# Patient Record
Sex: MALE | Race: WHITE | NOT HISPANIC OR LATINO | Employment: OTHER | URBAN - METROPOLITAN AREA
[De-identification: names, ages, dates, MRNs, and addresses within clinical notes are randomized per-mention and may not be internally consistent; named-entity substitution may affect disease eponyms.]

---

## 2017-05-05 ENCOUNTER — COMMUNICATION - HEALTHEAST (OUTPATIENT)
Dept: FAMILY MEDICINE | Facility: CLINIC | Age: 62
End: 2017-05-05

## 2017-09-27 ENCOUNTER — COMMUNICATION - HEALTHEAST (OUTPATIENT)
Dept: FAMILY MEDICINE | Facility: CLINIC | Age: 62
End: 2017-09-27

## 2017-09-27 ENCOUNTER — AMBULATORY - HEALTHEAST (OUTPATIENT)
Dept: FAMILY MEDICINE | Facility: CLINIC | Age: 62
End: 2017-09-27

## 2017-09-27 DIAGNOSIS — K43.9 VENTRAL HERNIA: ICD-10-CM

## 2017-10-18 ENCOUNTER — OFFICE VISIT - HEALTHEAST (OUTPATIENT)
Dept: SURGERY | Facility: CLINIC | Age: 62
End: 2017-10-18

## 2017-10-18 DIAGNOSIS — K42.9 UMBILICAL HERNIA WITHOUT OBSTRUCTION AND WITHOUT GANGRENE: ICD-10-CM

## 2017-10-30 ENCOUNTER — COMMUNICATION - HEALTHEAST (OUTPATIENT)
Dept: FAMILY MEDICINE | Facility: CLINIC | Age: 62
End: 2017-10-30

## 2017-10-30 ENCOUNTER — OFFICE VISIT - HEALTHEAST (OUTPATIENT)
Dept: FAMILY MEDICINE | Facility: CLINIC | Age: 62
End: 2017-10-30

## 2017-10-30 DIAGNOSIS — Z01.818 PREOP EXAMINATION: ICD-10-CM

## 2017-10-30 DIAGNOSIS — K43.9 VENTRAL HERNIA: ICD-10-CM

## 2017-10-30 ASSESSMENT — MIFFLIN-ST. JEOR: SCORE: 1886.01

## 2017-10-31 ENCOUNTER — COMMUNICATION - HEALTHEAST (OUTPATIENT)
Dept: FAMILY MEDICINE | Facility: CLINIC | Age: 62
End: 2017-10-31

## 2017-10-31 LAB
ATRIAL RATE - MUSE: 60 BPM
DIASTOLIC BLOOD PRESSURE - MUSE: NORMAL MMHG
INTERPRETATION ECG - MUSE: NORMAL
P AXIS - MUSE: -16 DEGREES
PR INTERVAL - MUSE: 182 MS
QRS DURATION - MUSE: 86 MS
QT - MUSE: 424 MS
QTC - MUSE: 424 MS
R AXIS - MUSE: 67 DEGREES
SYSTOLIC BLOOD PRESSURE - MUSE: NORMAL MMHG
T AXIS - MUSE: 64 DEGREES
VENTRICULAR RATE- MUSE: 60 BPM

## 2017-10-31 ASSESSMENT — MIFFLIN-ST. JEOR: SCORE: 1886.01

## 2017-11-09 ENCOUNTER — ANESTHESIA - HEALTHEAST (OUTPATIENT)
Dept: SURGERY | Facility: HOSPITAL | Age: 62
End: 2017-11-09

## 2017-11-10 ENCOUNTER — SURGERY - HEALTHEAST (OUTPATIENT)
Dept: SURGERY | Facility: HOSPITAL | Age: 62
End: 2017-11-10

## 2017-11-24 ENCOUNTER — OFFICE VISIT - HEALTHEAST (OUTPATIENT)
Dept: SURGERY | Facility: CLINIC | Age: 62
End: 2017-11-24

## 2017-11-24 DIAGNOSIS — Z48.89 POSTOPERATIVE VISIT: ICD-10-CM

## 2017-11-24 ASSESSMENT — MIFFLIN-ST. JEOR: SCORE: 1886.01

## 2017-12-15 ENCOUNTER — OFFICE VISIT - HEALTHEAST (OUTPATIENT)
Dept: FAMILY MEDICINE | Facility: CLINIC | Age: 62
End: 2017-12-15

## 2017-12-15 DIAGNOSIS — E03.9 HYPOTHYROIDISM: ICD-10-CM

## 2017-12-15 DIAGNOSIS — T14.8XXA WOUND DRAINAGE: ICD-10-CM

## 2017-12-15 DIAGNOSIS — K21.00 REFLUX ESOPHAGITIS: ICD-10-CM

## 2017-12-15 DIAGNOSIS — J31.0 RHINITIS: ICD-10-CM

## 2017-12-15 DIAGNOSIS — Z00.00 PHYSICAL EXAM: ICD-10-CM

## 2017-12-15 DIAGNOSIS — C44.91 BASAL CELL CARCINOMA OF SKIN: ICD-10-CM

## 2017-12-15 DIAGNOSIS — G47.33 OBSTRUCTIVE SLEEP APNEA: ICD-10-CM

## 2017-12-15 DIAGNOSIS — Z80.42 FAMILY HISTORY OF PROSTATE CANCER: ICD-10-CM

## 2017-12-15 LAB
CHOLEST SERPL-MCNC: 153 MG/DL
FASTING STATUS PATIENT QL REPORTED: YES
HDLC SERPL-MCNC: 33 MG/DL
LDLC SERPL CALC-MCNC: 96 MG/DL
PSA SERPL-MCNC: 2.3 NG/ML (ref 0–4.5)
TRIGL SERPL-MCNC: 120 MG/DL

## 2017-12-15 ASSESSMENT — MIFFLIN-ST. JEOR: SCORE: 1858.12

## 2017-12-18 ENCOUNTER — COMMUNICATION - HEALTHEAST (OUTPATIENT)
Dept: FAMILY MEDICINE | Facility: CLINIC | Age: 62
End: 2017-12-18

## 2018-01-20 ENCOUNTER — COMMUNICATION - HEALTHEAST (OUTPATIENT)
Dept: FAMILY MEDICINE | Facility: CLINIC | Age: 63
End: 2018-01-20

## 2018-01-20 DIAGNOSIS — K21.9 GERD (GASTROESOPHAGEAL REFLUX DISEASE): ICD-10-CM

## 2018-06-29 ENCOUNTER — RECORDS - HEALTHEAST (OUTPATIENT)
Dept: ADMINISTRATIVE | Facility: OTHER | Age: 63
End: 2018-06-29

## 2018-10-24 ENCOUNTER — COMMUNICATION - HEALTHEAST (OUTPATIENT)
Dept: FAMILY MEDICINE | Facility: CLINIC | Age: 63
End: 2018-10-24

## 2018-10-24 DIAGNOSIS — E03.9 HYPOTHYROIDISM, UNSPECIFIED TYPE: ICD-10-CM

## 2018-12-19 ENCOUNTER — OFFICE VISIT - HEALTHEAST (OUTPATIENT)
Dept: FAMILY MEDICINE | Facility: CLINIC | Age: 63
End: 2018-12-19

## 2018-12-19 DIAGNOSIS — Z13.220 SCREENING FOR LIPID DISORDERS: ICD-10-CM

## 2018-12-19 DIAGNOSIS — J30.89 NON-SEASONAL ALLERGIC RHINITIS, UNSPECIFIED TRIGGER: ICD-10-CM

## 2018-12-19 DIAGNOSIS — Z12.5 SCREENING FOR PROSTATE CANCER: ICD-10-CM

## 2018-12-19 DIAGNOSIS — E03.9 HYPOTHYROIDISM, UNSPECIFIED TYPE: ICD-10-CM

## 2018-12-19 DIAGNOSIS — Z12.11 SCREEN FOR COLON CANCER: ICD-10-CM

## 2018-12-19 DIAGNOSIS — D12.6 BENIGN NEOPLASM OF COLON, UNSPECIFIED PART OF COLON: ICD-10-CM

## 2018-12-19 DIAGNOSIS — Z00.00 PHYSICAL EXAM: ICD-10-CM

## 2018-12-19 DIAGNOSIS — C44.91 BASAL CELL CARCINOMA (BCC), UNSPECIFIED SITE: ICD-10-CM

## 2018-12-19 DIAGNOSIS — K21.00 REFLUX ESOPHAGITIS: ICD-10-CM

## 2018-12-19 DIAGNOSIS — G47.33 OBSTRUCTIVE SLEEP APNEA: ICD-10-CM

## 2018-12-19 DIAGNOSIS — G25.0 ESSENTIAL TREMOR: ICD-10-CM

## 2018-12-19 LAB
ALBUMIN SERPL-MCNC: 3.9 G/DL (ref 3.5–5)
ALP SERPL-CCNC: 89 U/L (ref 45–120)
ALT SERPL W P-5'-P-CCNC: 37 U/L (ref 0–45)
ANION GAP SERPL CALCULATED.3IONS-SCNC: 7 MMOL/L (ref 5–18)
AST SERPL W P-5'-P-CCNC: 27 U/L (ref 0–40)
BILIRUB SERPL-MCNC: 0.8 MG/DL (ref 0–1)
BUN SERPL-MCNC: 14 MG/DL (ref 8–22)
CALCIUM SERPL-MCNC: 9.1 MG/DL (ref 8.5–10.5)
CHLORIDE BLD-SCNC: 105 MMOL/L (ref 98–107)
CHOLEST SERPL-MCNC: 143 MG/DL
CO2 SERPL-SCNC: 30 MMOL/L (ref 22–31)
CREAT SERPL-MCNC: 1.06 MG/DL (ref 0.7–1.3)
FASTING STATUS PATIENT QL REPORTED: YES
GFR SERPL CREATININE-BSD FRML MDRD: >60 ML/MIN/1.73M2
GLUCOSE BLD-MCNC: 77 MG/DL (ref 70–125)
HDLC SERPL-MCNC: 38 MG/DL
LDLC SERPL CALC-MCNC: 92 MG/DL
POTASSIUM BLD-SCNC: 4.1 MMOL/L (ref 3.5–5)
PROT SERPL-MCNC: 7.1 G/DL (ref 6–8)
PSA SERPL-MCNC: 1.7 NG/ML (ref 0–4.5)
SODIUM SERPL-SCNC: 142 MMOL/L (ref 136–145)
TRIGL SERPL-MCNC: 63 MG/DL
TSH SERPL DL<=0.005 MIU/L-ACNC: 0.84 UIU/ML (ref 0.3–5)

## 2018-12-19 ASSESSMENT — MIFFLIN-ST. JEOR: SCORE: 1857.78

## 2018-12-21 ENCOUNTER — COMMUNICATION - HEALTHEAST (OUTPATIENT)
Dept: FAMILY MEDICINE | Facility: CLINIC | Age: 63
End: 2018-12-21

## 2019-01-07 ENCOUNTER — RECORDS - HEALTHEAST (OUTPATIENT)
Dept: ADMINISTRATIVE | Facility: OTHER | Age: 64
End: 2019-01-07

## 2019-01-28 ENCOUNTER — COMMUNICATION - HEALTHEAST (OUTPATIENT)
Dept: FAMILY MEDICINE | Facility: CLINIC | Age: 64
End: 2019-01-28

## 2019-01-28 DIAGNOSIS — K21.9 GERD (GASTROESOPHAGEAL REFLUX DISEASE): ICD-10-CM

## 2019-05-02 ENCOUNTER — COMMUNICATION - HEALTHEAST (OUTPATIENT)
Dept: FAMILY MEDICINE | Facility: CLINIC | Age: 64
End: 2019-05-02

## 2019-05-02 DIAGNOSIS — E03.9 HYPOTHYROIDISM, UNSPECIFIED TYPE: ICD-10-CM

## 2019-05-28 ENCOUNTER — COMMUNICATION - HEALTHEAST (OUTPATIENT)
Dept: FAMILY MEDICINE | Facility: CLINIC | Age: 64
End: 2019-05-28

## 2019-05-28 DIAGNOSIS — G47.33 OBSTRUCTIVE SLEEP APNEA: ICD-10-CM

## 2019-06-14 ENCOUNTER — RECORDS - HEALTHEAST (OUTPATIENT)
Dept: ADMINISTRATIVE | Facility: OTHER | Age: 64
End: 2019-06-14

## 2019-07-08 ENCOUNTER — RECORDS - HEALTHEAST (OUTPATIENT)
Dept: ADMINISTRATIVE | Facility: OTHER | Age: 64
End: 2019-07-08

## 2019-10-29 ENCOUNTER — COMMUNICATION - HEALTHEAST (OUTPATIENT)
Dept: FAMILY MEDICINE | Facility: CLINIC | Age: 64
End: 2019-10-29

## 2019-10-29 DIAGNOSIS — E03.9 HYPOTHYROIDISM, UNSPECIFIED TYPE: ICD-10-CM

## 2019-12-30 ENCOUNTER — OFFICE VISIT - HEALTHEAST (OUTPATIENT)
Dept: FAMILY MEDICINE | Facility: CLINIC | Age: 64
End: 2019-12-30

## 2019-12-30 ENCOUNTER — RECORDS - HEALTHEAST (OUTPATIENT)
Dept: FAMILY MEDICINE | Facility: CLINIC | Age: 64
End: 2019-12-30

## 2019-12-30 DIAGNOSIS — G25.0 ESSENTIAL TREMOR: ICD-10-CM

## 2019-12-30 DIAGNOSIS — K20.0 EOSINOPHILIC ESOPHAGITIS: ICD-10-CM

## 2019-12-30 DIAGNOSIS — C44.91 BASAL CELL CARCINOMA (BCC), UNSPECIFIED SITE: ICD-10-CM

## 2019-12-30 DIAGNOSIS — Z00.00 PHYSICAL EXAM: ICD-10-CM

## 2019-12-30 DIAGNOSIS — K21.00 REFLUX ESOPHAGITIS: ICD-10-CM

## 2019-12-30 DIAGNOSIS — J30.89 NON-SEASONAL ALLERGIC RHINITIS, UNSPECIFIED TRIGGER: ICD-10-CM

## 2019-12-30 DIAGNOSIS — Z13.220 SCREENING FOR LIPID DISORDERS: ICD-10-CM

## 2019-12-30 DIAGNOSIS — G47.33 OBSTRUCTIVE SLEEP APNEA: ICD-10-CM

## 2019-12-30 DIAGNOSIS — K21.9 GERD (GASTROESOPHAGEAL REFLUX DISEASE): ICD-10-CM

## 2019-12-30 DIAGNOSIS — Z12.5 SCREENING FOR PROSTATE CANCER: ICD-10-CM

## 2019-12-30 DIAGNOSIS — D12.6 BENIGN NEOPLASM OF COLON, UNSPECIFIED PART OF COLON: ICD-10-CM

## 2019-12-30 DIAGNOSIS — E03.9 HYPOTHYROIDISM, UNSPECIFIED TYPE: ICD-10-CM

## 2019-12-30 LAB
ANION GAP SERPL CALCULATED.3IONS-SCNC: 8 MMOL/L (ref 5–18)
BUN SERPL-MCNC: 11 MG/DL (ref 8–22)
CALCIUM SERPL-MCNC: 9.1 MG/DL (ref 8.5–10.5)
CHLORIDE BLD-SCNC: 108 MMOL/L (ref 98–107)
CHOLEST SERPL-MCNC: 138 MG/DL
CO2 SERPL-SCNC: 25 MMOL/L (ref 22–31)
CREAT SERPL-MCNC: 0.99 MG/DL (ref 0.7–1.3)
FASTING STATUS PATIENT QL REPORTED: YES
GFR SERPL CREATININE-BSD FRML MDRD: >60 ML/MIN/1.73M2
GLUCOSE BLD-MCNC: 82 MG/DL (ref 70–125)
HDLC SERPL-MCNC: 41 MG/DL
LDLC SERPL CALC-MCNC: 88 MG/DL
MAGNESIUM SERPL-MCNC: 1.9 MG/DL (ref 1.8–2.6)
POTASSIUM BLD-SCNC: 4 MMOL/L (ref 3.5–5)
PSA SERPL-MCNC: 2.4 NG/ML (ref 0–4.5)
SODIUM SERPL-SCNC: 141 MMOL/L (ref 136–145)
TRIGL SERPL-MCNC: 44 MG/DL
TSH SERPL DL<=0.005 MIU/L-ACNC: 0.79 UIU/ML (ref 0.3–5)
VIT B12 SERPL-MCNC: 406 PG/ML (ref 213–816)

## 2019-12-30 ASSESSMENT — MIFFLIN-ST. JEOR: SCORE: 1828.19

## 2019-12-31 ENCOUNTER — COMMUNICATION - HEALTHEAST (OUTPATIENT)
Dept: FAMILY MEDICINE | Facility: CLINIC | Age: 64
End: 2019-12-31

## 2020-02-26 ENCOUNTER — COMMUNICATION - HEALTHEAST (OUTPATIENT)
Dept: FAMILY MEDICINE | Facility: CLINIC | Age: 65
End: 2020-02-26

## 2020-02-26 DIAGNOSIS — K21.9 GERD (GASTROESOPHAGEAL REFLUX DISEASE): ICD-10-CM

## 2020-02-26 DIAGNOSIS — E03.9 HYPOTHYROIDISM, UNSPECIFIED TYPE: ICD-10-CM

## 2020-02-26 DIAGNOSIS — K21.00 REFLUX ESOPHAGITIS: ICD-10-CM

## 2020-06-19 ENCOUNTER — RECORDS - HEALTHEAST (OUTPATIENT)
Dept: ADMINISTRATIVE | Facility: OTHER | Age: 65
End: 2020-06-19

## 2020-07-27 ENCOUNTER — COMMUNICATION - HEALTHEAST (OUTPATIENT)
Dept: SCHEDULING | Facility: CLINIC | Age: 65
End: 2020-07-27

## 2020-07-28 ENCOUNTER — COMMUNICATION - HEALTHEAST (OUTPATIENT)
Dept: FAMILY MEDICINE | Facility: CLINIC | Age: 65
End: 2020-07-28

## 2020-07-31 ENCOUNTER — OFFICE VISIT - HEALTHEAST (OUTPATIENT)
Dept: FAMILY MEDICINE | Facility: CLINIC | Age: 65
End: 2020-07-31

## 2020-07-31 DIAGNOSIS — K62.5 RECTAL BLEEDING: ICD-10-CM

## 2020-07-31 LAB
ERYTHROCYTE [DISTWIDTH] IN BLOOD BY AUTOMATED COUNT: 14.8 % (ref 11–14.5)
HCT VFR BLD AUTO: 36.9 % (ref 40–54)
HGB BLD-MCNC: 12.1 G/DL (ref 14–18)
MCH RBC QN AUTO: 28.7 PG (ref 27–34)
MCHC RBC AUTO-ENTMCNC: 32.8 G/DL (ref 32–36)
MCV RBC AUTO: 87 FL (ref 80–100)
PLATELET # BLD AUTO: 182 THOU/UL (ref 140–440)
PMV BLD AUTO: 7.4 FL (ref 7–10)
RBC # BLD AUTO: 4.23 MILL/UL (ref 4.4–6.2)
WBC: 7.6 THOU/UL (ref 4–11)

## 2020-08-03 ENCOUNTER — RECORDS - HEALTHEAST (OUTPATIENT)
Dept: ADMINISTRATIVE | Facility: OTHER | Age: 65
End: 2020-08-03

## 2020-08-07 ENCOUNTER — RECORDS - HEALTHEAST (OUTPATIENT)
Dept: ADMINISTRATIVE | Facility: OTHER | Age: 65
End: 2020-08-07

## 2020-10-26 ENCOUNTER — AMBULATORY - HEALTHEAST (OUTPATIENT)
Dept: FAMILY MEDICINE | Facility: CLINIC | Age: 65
End: 2020-10-26

## 2020-10-26 ENCOUNTER — COMMUNICATION - HEALTHEAST (OUTPATIENT)
Dept: FAMILY MEDICINE | Facility: CLINIC | Age: 65
End: 2020-10-26

## 2020-10-26 DIAGNOSIS — K20.0 EOSINOPHILIC ESOPHAGITIS: ICD-10-CM

## 2020-11-25 ENCOUNTER — COMMUNICATION - HEALTHEAST (OUTPATIENT)
Dept: ADMINISTRATIVE | Facility: CLINIC | Age: 65
End: 2020-11-25

## 2020-11-25 DIAGNOSIS — E03.9 HYPOTHYROIDISM, UNSPECIFIED TYPE: ICD-10-CM

## 2020-12-18 ENCOUNTER — OFFICE VISIT - HEALTHEAST (OUTPATIENT)
Dept: FAMILY MEDICINE | Facility: CLINIC | Age: 65
End: 2020-12-18

## 2020-12-18 DIAGNOSIS — K21.00 GASTROESOPHAGEAL REFLUX DISEASE WITH ESOPHAGITIS, UNSPECIFIED WHETHER HEMORRHAGE: ICD-10-CM

## 2020-12-18 DIAGNOSIS — Z00.00 PHYSICAL EXAM: ICD-10-CM

## 2020-12-18 DIAGNOSIS — Z13.220 SCREENING FOR LIPID DISORDERS: ICD-10-CM

## 2020-12-18 DIAGNOSIS — K21.00 REFLUX ESOPHAGITIS: ICD-10-CM

## 2020-12-18 DIAGNOSIS — M79.645 PAIN OF LEFT THUMB: ICD-10-CM

## 2020-12-18 DIAGNOSIS — K21.9 GERD (GASTROESOPHAGEAL REFLUX DISEASE): ICD-10-CM

## 2020-12-18 DIAGNOSIS — Z12.5 SCREENING FOR PROSTATE CANCER: ICD-10-CM

## 2020-12-18 DIAGNOSIS — K20.0 EOSINOPHILIC ESOPHAGITIS: ICD-10-CM

## 2020-12-18 DIAGNOSIS — E03.9 HYPOTHYROIDISM, UNSPECIFIED TYPE: ICD-10-CM

## 2020-12-18 LAB
ANION GAP SERPL CALCULATED.3IONS-SCNC: 11 MMOL/L (ref 5–18)
BUN SERPL-MCNC: 13 MG/DL (ref 8–22)
CALCIUM SERPL-MCNC: 9.2 MG/DL (ref 8.5–10.5)
CHLORIDE BLD-SCNC: 105 MMOL/L (ref 98–107)
CHOLEST SERPL-MCNC: 116 MG/DL
CO2 SERPL-SCNC: 26 MMOL/L (ref 22–31)
CREAT SERPL-MCNC: 1.06 MG/DL (ref 0.7–1.3)
FASTING STATUS PATIENT QL REPORTED: YES
GFR SERPL CREATININE-BSD FRML MDRD: >60 ML/MIN/1.73M2
GLUCOSE BLD-MCNC: 83 MG/DL (ref 70–125)
HDLC SERPL-MCNC: 33 MG/DL
LDLC SERPL CALC-MCNC: 74 MG/DL
POTASSIUM BLD-SCNC: 4.3 MMOL/L (ref 3.5–5)
PSA SERPL-MCNC: 2.8 NG/ML (ref 0–4.5)
SODIUM SERPL-SCNC: 142 MMOL/L (ref 136–145)
TRIGL SERPL-MCNC: 46 MG/DL
TSH SERPL DL<=0.005 MIU/L-ACNC: 0.18 UIU/ML (ref 0.3–5)

## 2020-12-18 RX ORDER — LEVOTHYROXINE SODIUM 150 UG/1
150 TABLET ORAL DAILY
Qty: 90 TABLET | Refills: 3 | Status: SHIPPED | OUTPATIENT
Start: 2020-12-18 | End: 2021-11-22

## 2020-12-18 RX ORDER — LANSOPRAZOLE 30 MG/1
CAPSULE, DELAYED RELEASE ORAL
Qty: 180 CAPSULE | Refills: 3 | Status: SHIPPED | OUTPATIENT
Start: 2020-12-18 | End: 2021-11-22

## 2020-12-18 ASSESSMENT — MIFFLIN-ST. JEOR: SCORE: 1790.54

## 2020-12-26 ENCOUNTER — AMBULATORY - HEALTHEAST (OUTPATIENT)
Dept: FAMILY MEDICINE | Facility: CLINIC | Age: 65
End: 2020-12-26

## 2020-12-26 ENCOUNTER — COMMUNICATION - HEALTHEAST (OUTPATIENT)
Dept: FAMILY MEDICINE | Facility: CLINIC | Age: 65
End: 2020-12-26

## 2020-12-26 DIAGNOSIS — E03.9 HYPOTHYROIDISM, UNSPECIFIED TYPE: ICD-10-CM

## 2021-02-09 ENCOUNTER — OFFICE VISIT - HEALTHEAST (OUTPATIENT)
Dept: RHEUMATOLOGY | Facility: CLINIC | Age: 66
End: 2021-02-09

## 2021-02-09 DIAGNOSIS — G89.29 CHRONIC HAND PAIN, LEFT: ICD-10-CM

## 2021-02-09 DIAGNOSIS — M19.042 LOCALIZED PRIMARY OSTEOARTHRITIS OF LEFT HAND: ICD-10-CM

## 2021-02-09 DIAGNOSIS — M79.642 CHRONIC HAND PAIN, LEFT: ICD-10-CM

## 2021-02-11 ENCOUNTER — COMMUNICATION - HEALTHEAST (OUTPATIENT)
Dept: ADMINISTRATIVE | Facility: CLINIC | Age: 66
End: 2021-02-11

## 2021-02-12 ENCOUNTER — HOSPITAL ENCOUNTER (OUTPATIENT)
Dept: RADIOLOGY | Facility: CLINIC | Age: 66
Discharge: HOME | End: 2021-02-12
Attending: INTERNAL MEDICINE

## 2021-02-12 DIAGNOSIS — M19.042 LOCALIZED PRIMARY OSTEOARTHRITIS OF LEFT HAND: ICD-10-CM

## 2021-02-12 DIAGNOSIS — M79.642 CHRONIC HAND PAIN, LEFT: ICD-10-CM

## 2021-02-12 DIAGNOSIS — G89.29 CHRONIC HAND PAIN, LEFT: ICD-10-CM

## 2021-05-24 ENCOUNTER — RECORDS - HEALTHEAST (OUTPATIENT)
Dept: ADMINISTRATIVE | Facility: CLINIC | Age: 66
End: 2021-05-24

## 2021-05-25 ENCOUNTER — RECORDS - HEALTHEAST (OUTPATIENT)
Dept: ADMINISTRATIVE | Facility: CLINIC | Age: 66
End: 2021-05-25

## 2021-05-28 NOTE — TELEPHONE ENCOUNTER
Refill Approved    Rx renewed per Medication Renewal Policy. Medication was last renewed on 10/24/18.    Khadra Hanks, [facility] Connection Triage/Med Refill 5/2/2019     Requested Prescriptions   Pending Prescriptions Disp Refills     levothyroxine (SYNTHROID, LEVOTHROID) 150 MCG tablet [Pharmacy Med Name: LEVOTHYROXINE 150 MCG TABLET] 90 tablet 1     Sig: TAKE 1 TABLET BY MOUTH EVERY DAY       Thyroid Hormones Protocol Passed - 5/2/2019  8:28 AM        Passed - Provider visit in past 12 months or next 3 months     Last office visit with prescriber/PCP: Visit date not found OR same dept: Visit date not found OR same specialty: Visit date not found  Last physical: 12/19/2018 Last MTM visit: Visit date not found   Next visit within 3 mo: Visit date not found  Next physical within 3 mo: Visit date not found  Prescriber OR PCP: Alphonse Moreno MD  Last diagnosis associated with med order: 1. Hypothyroidism, unspecified type  - levothyroxine (SYNTHROID, LEVOTHROID) 150 MCG tablet [Pharmacy Med Name: LEVOTHYROXINE 150 MCG TABLET]; TAKE 1 TABLET BY MOUTH EVERY DAY  Dispense: 90 tablet; Refill: 1    If protocol passes may refill for 12 months if within 3 months of last provider visit (or a total of 15 months).             Passed - TSH on file in past 12 months for patient age 12 & older     TSH   Date Value Ref Range Status   12/19/2018 0.84 0.30 - 5.00 uIU/mL Final

## 2021-05-29 NOTE — TELEPHONE ENCOUNTER
Orders being requested: Patient relays that [facility] requested CPAP supplies but was told by this  clinic that first patient needs an office visit. Patient is asking why does  he needs this appointment as he was in for his annual in December?  Please advise.   Reason service is needed/diagnosis: JORGE  When are orders needed by: ASAP  Where to send Orders: [facility]  Okay to leave detailed message?  Yes

## 2021-05-31 VITALS — WEIGHT: 235 LBS | BODY MASS INDEX: 31.83 KG/M2 | HEIGHT: 72 IN

## 2021-05-31 VITALS — HEIGHT: 72 IN | WEIGHT: 235 LBS | BODY MASS INDEX: 31.83 KG/M2

## 2021-05-31 VITALS — BODY MASS INDEX: 32.28 KG/M2 | WEIGHT: 230.6 LBS | HEIGHT: 71 IN

## 2021-05-31 VITALS — BODY MASS INDEX: 32.64 KG/M2 | WEIGHT: 234 LBS

## 2021-06-02 VITALS — WEIGHT: 231.4 LBS | HEIGHT: 71 IN | BODY MASS INDEX: 32.4 KG/M2

## 2021-06-02 NOTE — TELEPHONE ENCOUNTER
Refill Approved    Rx renewed per Medication Renewal Policy. Medication was last renewed on 5/2/19.    Khadra Hanks, [facility] Connection Triage/Med Refill 10/29/2019     Requested Prescriptions   Pending Prescriptions Disp Refills     levothyroxine (SYNTHROID, LEVOTHROID) 150 MCG tablet [Pharmacy Med Name: LEVOTHYROXINE 150 MCG TABLET] 90 tablet 1     Sig: TAKE 1 TABLET BY MOUTH EVERY DAY       Thyroid Hormones Protocol Passed - 10/29/2019  1:21 AM        Passed - Provider visit in past 12 months or next 3 months     Last office visit with prescriber/PCP: Visit date not found OR same dept: Visit date not found OR same specialty: Visit date not found  Last physical: 12/19/2018 Last MTM visit: Visit date not found   Next visit within 3 mo: Visit date not found  Next physical within 3 mo: Visit date not found  Prescriber OR PCP: Alphonse Moreno MD  Last diagnosis associated with med order: 1. Hypothyroidism, unspecified type  - levothyroxine (SYNTHROID, LEVOTHROID) 150 MCG tablet [Pharmacy Med Name: LEVOTHYROXINE 150 MCG TABLET]; TAKE 1 TABLET BY MOUTH EVERY DAY  Dispense: 90 tablet; Refill: 1    If protocol passes may refill for 12 months if within 3 months of last provider visit (or a total of 15 months).             Passed - TSH on file in past 12 months for patient age 12 & older     TSH   Date Value Ref Range Status   12/19/2018 0.84 0.30 - 5.00 uIU/mL Final

## 2021-06-04 VITALS
DIASTOLIC BLOOD PRESSURE: 77 MMHG | HEIGHT: 71 IN | WEIGHT: 224 LBS | OXYGEN SATURATION: 97 % | BODY MASS INDEX: 31.36 KG/M2 | SYSTOLIC BLOOD PRESSURE: 127 MMHG | HEART RATE: 58 BPM

## 2021-06-04 VITALS
SYSTOLIC BLOOD PRESSURE: 119 MMHG | WEIGHT: 234 LBS | BODY MASS INDEX: 32.64 KG/M2 | HEART RATE: 52 BPM | OXYGEN SATURATION: 98 % | DIASTOLIC BLOOD PRESSURE: 69 MMHG

## 2021-06-05 VITALS
SYSTOLIC BLOOD PRESSURE: 108 MMHG | DIASTOLIC BLOOD PRESSURE: 68 MMHG | HEART RATE: 58 BPM | WEIGHT: 215.7 LBS | HEIGHT: 71 IN | BODY MASS INDEX: 30.2 KG/M2

## 2021-06-06 NOTE — TELEPHONE ENCOUNTER
Left detailed message on patient's voicemail with provider's message below.     Beatriz QUINTANILLA CMA

## 2021-06-06 NOTE — TELEPHONE ENCOUNTER
Refill Approved    Rx renewed per Medication Renewal Policy. Medication was last renewed on 10/29/19.    Khadra Hanks, [facility] Connection Triage/Med Refill 2/27/2020     Requested Prescriptions   Pending Prescriptions Disp Refills     levothyroxine (SYNTHROID, LEVOTHROID) 150 MCG tablet 90 tablet 0     Sig: Take 1 tablet (150 mcg total) by mouth daily.       Thyroid Hormones Protocol Passed - 2/26/2020  1:48 PM        Passed - Provider visit in past 12 months or next 3 months     Last office visit with prescriber/PCP: Visit date not found OR same dept: Visit date not found OR same specialty: Visit date not found  Last physical: 12/30/2019 Last MTM visit: Visit date not found   Next visit within 3 mo: Visit date not found  Next physical within 3 mo: Visit date not found  Prescriber OR PCP: Alphonse Moreno MD  Last diagnosis associated with med order: 1. Hypothyroidism, unspecified type  - levothyroxine (SYNTHROID, LEVOTHROID) 150 MCG tablet; Take 1 tablet (150 mcg total) by mouth daily.  Dispense: 90 tablet; Refill: 0    If protocol passes may refill for 12 months if within 3 months of last provider visit (or a total of 15 months).             Passed - TSH on file in past 12 months for patient age 12 & older     TSH   Date Value Ref Range Status   12/30/2019 0.79 0.30 - 5.00 uIU/mL Final

## 2021-06-06 NOTE — TELEPHONE ENCOUNTER
Medication Request  Medication name: Lansoprazole 30 mg, two times a day   Requested Pharmacy: [facility]  Reason for request:Current medication  When did you use medication last?:  Today  Patient offered appointment:  patient declined  Okay to leave a detailed message: yes    Patient is ready for Dr. Moreno to send this prescription now.

## 2021-06-10 NOTE — PATIENT INSTRUCTIONS - HE
Call [facility] at [phone] to set up your colonoscopy.    Recheck blood counts today

## 2021-06-10 NOTE — PROGRESS NOTES
Clinic Note    Assessment:     Assessment and Plan:  1. Rectal bleeding  - HM2(CBC w/o Differential)  - Ambulatory referral for Colonoscopy       Patient Instructions   Call [facility] at [phone] to set up your colonoscopy.    Recheck blood counts today    Return in about 3 months (around 10/31/2020).         Subjective:      Du Mayo is a 65 y.o. male who comes the clinic today for ED follow-up.    He was seen at the [facility] ED on 7/27 for rectal bleeding.    Apparently, he had been experiencing bright red blood per rectum for a few days prior to his presentation.    There were 2 instances in which he passed nothing but bright red blood, no stool involved-this was particularly concerning for him.    He had a colonoscopy in June 2019 which revealed multiple polyps throughout his colon and rectum; he was given a 2-year follow-up.    He denies any dizziness or lightheaded dizzy spells.  No abdominal pain.  No fevers.  Eating and drinking normally.  No coffee-ground emesis.    The following portions of the patient's history were reviewed and updated as appropriate: Allergies, medications, problems, prior note.    Review of Systems:    Review is otherwise negative except for what is mentioned above.     Social Hx:    Social History     Tobacco Use   Smoking Status Never Smoker   Smokeless Tobacco Never Used         Objective:     Vitals:    07/31/20 1303   BP: 119/69   Pulse: (!) 52   SpO2: 98%   Weight: (!) 234 lb (106.1 kg)       Exam:    General: No apparent distress. Calm. Alert and Oriented X3. Pt behavior is appropriate.      Patient Active Problem List   Diagnosis     Benign Adenomatous Polyp Of The Large Intestine     Male Erectile Disorder     Obstructive sleep apnea     Essential tremor     Rhinitis     Hypothyroidism     Obesity     Chronic Reflux Esophagitis     Basal Cell Carcinoma Of The Skin     Eosinophilic esophagitis     Current Outpatient Medications   Medication Sig  Dispense Refill     fluticasone propionate (FLONASE) 50 mcg/actuation nasal spray Apply 1 spray into each nostril daily.       lansoprazole (PREVACID) 30 MG capsule Take one pill twice daily 180 capsule 1     levothyroxine (SYNTHROID, LEVOTHROID) 150 MCG tablet Take 1 tablet (150 mcg total) by mouth daily. 90 tablet 2     ibuprofen (ADVIL,MOTRIN) 200 MG tablet Take 600 mg by mouth 2 (two) times a day as needed for pain.       loratadine (CLARITIN) 10 mg tablet Take 10 mg by mouth daily.        No current facility-administered medications for this visit.            Herve Cordon (Rob), PHILOMENA    7/31/2020

## 2021-06-10 NOTE — TELEPHONE ENCOUNTER
"  Call from pt       CC:  Rectal bleeding over the weekend      Colonoscopy planned for Jan 2021 (2yr cycle)       Over the weekend had 2+ episodes of rectal bleeding  \"to the point it was only blood\" in the toilet       No fainting - is awake / alert      Also having some reoccuring issues with swallowing - mo worsening over the past 2wks         A/P:  > Referred to ED for eval due to current sx   > I will message provider to alert them that you are going at your request         Agapito Sims rn             Reason for Disposition    MODERATE rectal bleeding (small blood clots, passing blood without stool, or toilet water turns red) more than once a day    Additional Information    Negative: Passed out (i.e., fainted, collapsed and was not responding)    Negative: Shock suspected (e.g., cold/pale/clammy skin, too weak to stand, low BP, rapid pulse)    Negative: Vomiting red blood or black (coffee ground) material    Negative: Sounds like a life-threatening emergency to the triager    Negative: Diarrhea is the main symptom    Negative: Rectal symptoms    Negative: SEVERE dizziness (e.g., unable to stand, requires support to walk, feels like passing out now)    Negative: SEVERE rectal bleeding (large blood clots; on and off, or constant bleeding)    Protocols used: RECTAL BLEEDING-A-OH      "

## 2021-06-10 NOTE — TELEPHONE ENCOUNTER
New Appointment Needed  What is the reason for the visit:    Inpatient/ED Follow Up Appt Request  At what hospital or facility were you seen?: [facility]  What is the reason you were seen?: Blood in the stool  What date were you admitted?: date: 7/27/20  What date were you discharged?: date: 7/27/20  What was the recommended timeframe for your follow up appointment?: requests f/u this week  Provider Preference: Prefers Dr Moreno  How soon do you need to be seen?: Friday after 11:30  Waitlist offered?: No  Okay to leave a detailed message:  Yes    Patient was advised to have Hemoglobin tested. Is also requesting a referral for a colonoscopy.

## 2021-06-12 NOTE — TELEPHONE ENCOUNTER
Referral Request  Type of referral: Gastroenterology  Who s requesting: Patient  Why the request:   Patient is requesting an upper endoscopy and ring extension procedure.  Have you been seen for this request: Yes  Does patient have a preference on a group/provider?   [facility]  Okay to leave a detailed message?  Yes

## 2021-06-13 NOTE — PROGRESS NOTES
ASSESSMENT:  1. Preop examination  For ventral hernia repair.  - Basic Metabolic Panel  - HM2(CBC w/o Differential)  - Electrocardiogram Perform and Read    2. Ventral hernia  Now becoming symptomatic.    PLAN:  1.  CBC and basic metabolic profile reviewed.  2.  Hemoglobin slightly low at 12.7, unclear etiology, will follow.  3.  EKG reviewed.  Normal sinus rhythm.  4.  Patient cleared for surgery.  5.  Patient will be due for physical in 2018, should repeat a CBC.      Orders Placed This Encounter   Procedures     Basic Metabolic Panel     HM2(CBC w/o Differential)     Electrocardiogram Perform and Read     There are no discontinued medications.    No Follow-up on file.    Patient approved for surgery with general or local anesthesia.     CHIEF COMPLAINT:  Chief Complaint   Patient presents with     Pre-op Exam     NEEDS: zoster,flu vacc        Subjective:     Scheduled Procedure: robotic ventral hernia repair   Surgery Date:  11-10-17  Surgery Location: [facility]   Surgeon:  Dr. Keith   Recovery Plan: going home after surgery     Du is a 62 y.o. male here for an internal medicine pre-operative consultation. The exam is requested by Dr. Keith in preparation for robotic ventral hernia repair to be performed at [facility] on 11/10/17. Today s examination on 10/31/2017 is done to review the underlying surgical condition of patient comorbidities, clear for anesthesia, and review medical problems with appropriate changes in medications. He started to notice the hernia between five and ten years ago. He states that the hernia has enlarged, has completely encircled his naval, and has become more red and swollen. As a result, it has started to bother him more.     Du has tolerated previous surgeries well without bleeding or anesthesia difficulty. He has underwent an appendectomy, tonsillectomy, esophageal dilation, and Mohs surgery.     GERD: He continues to use a daily dose of Prevacid.     Health  Maintenance: He has already received the influenza vaccine for the upcoming season. Now that he is [age], he is eligible to receive the shingles vaccine. He opts to wait until the new vaccine comes out.     Current Outpatient Prescriptions   Medication Sig Dispense Refill     cetirizine (ZYRTEC) 10 MG tablet Take 10 mg by mouth daily as needed.        lansoprazole (PREVACID) 30 MG capsule TAKE 1 CAPSULE BY MOUTH DAILY 90 capsule 3     levothyroxine (SYNTHROID, LEVOTHROID) 150 MCG tablet Take 1 tablet (150 mcg total) by mouth daily. 90 tablet 2     loratadine (CLARITIN) 10 mg tablet Take 10 mg by mouth daily as needed.        triamcinolone (KENALOG) 0.1 % ointment APPLY TO RIGHT LOWER LEG TWICE A DAY. AVOID FACE, NECK, GROIN, & BODY FOLDS  1     No current facility-administered medications for this visit.      No Known Allergies      Immunization History   Administered Date(s) Administered     DT (pediatric) 07/22/2003     Hep A, historic 01/01/2000     Hep B, historic 01/01/2000     Influenza [id number], 02/02/2010     Influenza, inj, historic 01/01/2000, 10/01/2010, 11/01/2011, 11/01/2016     Influenza, seasonal,quad inj 36+ mos 10/29/2014, 10/30/2015     Td, historic 12/05/2007     Tdap 12/05/2007, 04/23/2015       Patient Active Problem List   Diagnosis     Benign Adenomatous Polyp Of The Large Intestine     Male Erectile Disorder     Obstructive Sleep Apnea     Familial (Benign Essential) Tremor     Allergies     Hypothyroidism     Obesity     Chronic Reflux Esophagitis     Ventral hernia     Basal Cell Carcinoma Of The Skin     Social History     Social History     Marital status:      Spouse name: N/A     Number of children: 1     Years of education: N/A     Occupational History      3m     Social History Main Topics     Smoking status: Never Smoker     Smokeless tobacco: Never Used     Alcohol use Yes      Comment: very little     Drug use: No     Sexual activity: Yes     Partners:  Female     Other Topics Concern     Not on file     Social History Narrative    Diet- Regular, reduce portion sizes.        Exercise- Basketball 1x per week, Fitness center/cardio 2-4x per week     Past Surgical History:   Procedure Laterality Date     APPENDECTOMY       ESOPHAGEAL DILATION      for a Schatze's Ring-on prevacid-trouble swallowing      MOHS SURGERY       TONSILLECTOMY AND ADENOIDECTOMY       Family History   Problem Relation Age of Onset     Prostate cancer Father      Dx 50s     Skin cancer Father      sister     Pacemaker Father      Tremor Father      Benign essential tremor     Heart attack Father 87     Prostate cancer Brother      Dx 60s     Asthma Brother      Skin cancer Brother      Hypothyroidism Mother      Osteoporosis Mother      Sleep apnea Sister      Asthma Sister      Scoliosis Sister      Scoliosis Sister      History of Present Illness  Recent Health  Fever: no  Chills: no  Fatigue: no  Chest Pain: no  Cough: no  Dyspnea: no  Urinary Frequency: no  Nausea:no   Vomiting: no  Diarrhea: no  Abdominal Pain: no  Easy Bruising: no  Lower Extremity Swelling:no   Poor Exercise Tolerance:no     Pertinent History  Prior Anesthesia: yes  Previous Anesthesia Reaction:  no  Diabetes:no     Cardiovascular Disease:no   Pulmonary Disease: no  Renal Disease: no  GI Disease: yes- GERD  Sleep Apnea: yes, JORGE  Thromboembolic Problems: no  Clotting Disorder:no   Bleeding Disorder:no   Transfusion Reaction: no  Impaired Immunity:no    Steroid use in the last 6 months: no   Frequent Aspirin use: no    Review of Systems  Review of Systems   Constitutional: Negative  HENT: Negative  Eyes:Negative   Respiratory:  Negative   Cardiovascular: Negative  Gastrointestinal:  Negative    Endocrine: Negative   Genitourinary:  Negative  Musculoskeletal: Negative  Skin: Negative   Neurological: Negative   Hematological:  Negative  Psychiatric/Behavioral: Negative    Objective:      Vitals:    10/30/17 0757   BP:  120/70   Pulse: 69   Resp: 14   Temp: 98.1  F (36.7  C)   SpO2: 96%     Wt Readings from Last 3 Encounters:   10/30/17 (!) 235 lb (106.6 kg)   10/31/17 (!) 235 lb (106.6 kg)   10/18/17 (!) 234 lb (106.1 kg)     BP Readings from Last 3 Encounters:   10/30/17 120/70   10/18/17 138/82   12/16/16 112/72     Body mass index is 32.32 kg/(m^2).    Physical Exam:  Constitutional: he appears well-developed and well-nourished.   HENT:   Right Ear: External ear normal.   Left Ear: External ear normal.   Nose: Nose normal.   Mouth/Throat: Oropharynx is clear and moist.   Eyes: Conjunctivae and EOM are normal. Pupils are equal, round, and reactive to light. Right eye exhibits no discharge. Left eye exhibits no discharge.   Neck: No thyromegaly present.   Cardiovascular: Normal rate, regular rhythm and normal heart sounds. No murmur heard.  Pulmonary/Chest: Effort normal and breath sounds normal.   Abdominal: Palpable umbilical hernia approximately 10 centimeters.   Musculoskeletal: Normal range of motion.   No joint swelling or deformity.   Lymphadenopathy:     He has no cervical adenopathy.   Neurological: he is alert. he has normal reflexes.   Skin: Skin is warm and dry. No rash noted.   Psychiatric: he has a normal mood and affect.   EKG: Sinus, no ischemic changes.     DATA REVIEWED:  Additional History from Old Records Summarized (2): Reviewed general surgery consult 10/18/17; ventral hernia.   Decision to Obtain Records (1): None.  Radiology Tests Summarized or Ordered (1): None.  Labs Reviewed or Ordered (1): Ordered labs; CBC, basic metabolic panel.   Medicine Test Summarized or Ordered (1): Ordered EKG.  Independent Review of EKG, X-RAY, or RAPID STREP (2 each): Review or EKG; see interpretation above.     The visit lasted a total of 6 minutes face to face with the patient. Over 50% of the time was spent counseling and educating the patient about preparation for surgery.    Elias BENAVIDES, am scribing for and in the  presence of, Dr. Moreno.    I, Dr. Moreno, personally performed the services described in this documentation, as scribed by Elias Murguia in my presence, and it is both accurate and complete.    Total data points: 6

## 2021-06-13 NOTE — TELEPHONE ENCOUNTER
RN cannot approve Refill Request    RN can NOT refill this medication Protocol failed and NO refill given. Last office visit: Visit date not found Last Physical: 12/30/2019 Last MTM visit: Visit date not found Last visit same specialty: Visit date not found.  Next visit within 3 mo: Visit date not found  Next physical within 3 mo: Visit date not found      Alice Bennett, [facility] Triage/Med Refill 11/26/2020    Requested Prescriptions   Pending Prescriptions Disp Refills     levothyroxine (SYNTHROID, LEVOTHROID) 150 MCG tablet 90 tablet 2     Sig: Take 1 tablet (150 mcg total) by mouth daily.       There is no refill protocol information for this order

## 2021-06-13 NOTE — PROGRESS NOTES
HPI:  Du Mayo is a 62 y.o. male who was referred to me by Alphonse Moreno MD for an umbilical hernia.  He presents with complaints of a bulge at the umbilical region with intermittent dicomfort.   He has noted this for the past several years and states that is progressively enlarging.  He also notices intermittent skin changes including pink discoloration.  He states that the pain is progressively worsening and he would like to have his hernia repaired..  He denies any nausea, vomiting, fevers, chills, bloody bowel movements or any other complaints at this time.     Allergies:Review of patient's allergies indicates no known allergies.    Past Medical History:   Diagnosis Date     Cancer     Basal Cell Ca     Eczema      GERD (gastroesophageal reflux disease)      Sleep apnea     uses C-Pap       Past Surgical History:   Procedure Laterality Date     APPENDECTOMY       ESOPHAGEAL DILATION      for a Schatze's Ring-on prevacid-trouble swallowing      MOHS SURGERY       TONSILLECTOMY AND ADENOIDECTOMY         CURRENT MEDS:  Current Outpatient Prescriptions   Medication Sig Dispense Refill     cetirizine (ZYRTEC) 10 MG tablet Take 10 mg by mouth daily.       lansoprazole (PREVACID) 30 MG capsule TAKE 1 CAPSULE BY MOUTH DAILY 90 capsule 3     levothyroxine (SYNTHROID, LEVOTHROID) 150 MCG tablet Take 1 tablet (150 mcg total) by mouth daily. 90 tablet 2     loratadine (CLARITIN) 10 mg tablet Take 10 mg by mouth daily.       mupirocin (BACTROBAN) 2 % ointment APPLY TO WOUND ON LEFT INNER THIGH TWICE DAILY UNTIL HEALED.  1     triamcinolone (KENALOG) 0.1 % ointment APPLY TO RIGHT LOWER LEG TWICE A DAY. AVOID FACE, NECK, GROIN, & BODY FOLDS  1     No current facility-administered medications for this visit.        Family History   Problem Relation Age of Onset     Prostate cancer Father      Dx 50s     Skin cancer Father      sister     Pacemaker Father      Tremor Father      Benign essential tremor     Heart  attack Father 87     Prostate cancer Brother      Dx 60s     Asthma Brother      Skin cancer Brother      Hypothyroidism Mother      Osteoporosis Mother      Sleep apnea Sister      Asthma Sister      Scoliosis Sister      Scoliosis Sister         reports that he has never smoked. He has never used smokeless tobacco. He reports that he drinks alcohol. He reports that he does not use illicit drugs.    Review of Systems -   The 12 point review of systems  is within normal limits except for as mentioned above in the HPI.  General ROS: No complaints or constitutional symptoms  Ophthalmic ROS: No complaints of visual changes  Skin: No complaints or symptoms   Endocrine: No complaints or symptoms  Hematologic/Lymphatic: No symptoms or complaints  Psychiatric: No symptoms or complaints  Respiratory ROS: no cough, shortness of breath, or wheezing  Cardiovascular ROS: no chest pain or dyspnea on exertion  Gastrointestinal ROS: As per HPI  Genito-Urinary ROS: no dysuria, trouble voiding, or hematuria  Musculoskeletal ROS: no joint or muscle pain  Neurological ROS: no TIA or stroke symptoms      /82 (Patient Site: Right Arm, Patient Position: Sitting, Cuff Size: Adult Regular)  Pulse (!) 55  Wt (!) 234 lb (106.1 kg)  SpO2 97%  BMI 32.64 kg/m2  Body mass index is 32.64 kg/(m^2).    EXAM:  GENERAL: Well developed male  HEENT: Extra ocular muscles intact, pupils are round and reactive, sclera is anicteric,   NECK:  No obvious masses or deformities  CARDIAC: RRR w/out murmur   CHEST/LUNG: Clear to auscultation bilaterally  ABDOMEN: Soft, nontender, 2-3 cm palpable defect with incarcerated adipose tissue  NEURO: No obvious defects noted.  EXT: No edema, no obvious deformities or any other abnormalities    Assessment/Plan:    Du Mayo is a 62 y.o. male with an umbilical hernia.  The pathophysiology of umbilical hernias was discussed as were the surgical and non-operative management strategies.      The risks of  surgery were discussed which include, but are not limited to, bleeding, infection, recurrent hernia, chronic pain, poor cosmesis, blood clots, stroke, heart attack and death.  Additionally, the risks of observation were discussed which include, but are not limited to, enlargement of the hernia, incarceration, strangulation, pain and death.      He understands everything which was discussed and has consented to proceed with surgery.  We will therefore schedule robotic umbilical hernia surgery accordingly.      Jareth Keith D.O. [facility]  [phone]  [facility] Department of Surgery

## 2021-06-14 NOTE — PROGRESS NOTES
"HPI: Pt is here for follow up of a ventral hernia repair done with Robot with Dr Keith.   he is doing well.  Pain is well controlled.  No difficulties with the surgical wound/wounds.  he is eating well and denies fever and chills.   He did hve leaking at belly button and a blister that broke open.       /75 (Patient Site: Right Arm, Patient Position: Sitting, Cuff Size: Adult Large)  Pulse 97  Ht 5' 11.5\" (1.816 m)  Wt (!) 235 lb (106.6 kg)  BMI 32.32 kg/m2    EXAM:  GENERAL:Appears well  ABDOMEN:  Soft, +BS  SURGICAL WOUNDS:  Over umbilicus. Open wound with thin fibrin covering. Skin is very thin here. Small opening and very little serous fluid draining from mild small seroma under umbilicus.     .    Assessment/Plan: . Doing well over all but looks like had a small seroma that then broke out of skin and had skin breakdown and and top of blister skin peeled off. sorbisan covering over wound for now daily. See us Thursday to see us before he heads out to [place] for two weeks.   Terri Gabriel PA-C  [facility] Department of Surgery    "

## 2021-06-14 NOTE — ANESTHESIA CARE TRANSFER NOTE
Last vitals:   Vitals:    11/10/17 1000   BP: 124/71   Pulse: 90   Resp: 19   Temp: 36.4  C (97.6  F)   SpO2: 99%     Patient's level of consciousness is drowsy  Spontaneous respirations: yes  Maintains airway independently: yes  Dentition unchanged: yes  Oropharynx: oropharynx clear of all foreign objects    QCDR Measures:  ASA# 20 - Surgical Safety Checklist: WHO surgical safety checklist completed prior to induction  PQRS# 430 - Adult PONV Prevention: 4558F - Pt received => 2 anti-emetic agents (different classes) preop & intraop  ASA# 8 - Peds PONV Prevention: NA - Not pediatric patient, not GA or 2 or more risk factors NOT present  PQRS# 424 - Poly-op Temp Management: 4559F - At least one body temp DOCUMENTED => 35.5C or 95.9F within required timeframe  PQRS# 426 - PACU Transfer Protocol: - Transfer of care checklist used  ASA# 14 - Acute Post-op Pain: ASA14B - Patient did NOT experience pain >= 7 out of 10   Twila Townsend

## 2021-06-14 NOTE — ANESTHESIA PREPROCEDURE EVALUATION
Anesthesia Evaluation      Patient summary reviewed   No history of anesthetic complications     Airway   Mallampati: I  Neck ROM: full   Pulmonary - normal exam   (+) sleep apnea on CPAP, severe,                          Cardiovascular - negative ROS and normal exam  Exercise tolerance: > or = 4 METS  (-) murmur  Rhythm: regular  Rate: normal,    no murmur      Neuro/Psych - negative ROS     Endo/Other    (+) hypothyroidism,      GI/Hepatic/Renal    (+) GERD well controlled,             Dental - normal exam                        Anesthesia Plan  Planned anesthetic: general endotracheal    ASA 2   Induction: intravenous   Anesthetic plan and risks discussed with: patient and spouse  Anesthesia plan special considerations: antiemetics,   Post-op plan: routine recovery

## 2021-06-14 NOTE — PROGRESS NOTES
ASSESSMENT:  1. Physical exam  Patient overall has good health habits.  - Comprehensive Metabolic Panel  - Lipid Cascade    2. Obstructive sleep apnea  Nightly CPAP.    3. Hypothyroidism  Thyroid replacement  - Thyroid Stimulating Hormone (TSH)    4. Chronic Reflux Esophagitis  Patient will be on lifelong Prevacid secondary to a Schatzki ring and previous esophageal dilatation.    5. Basal cell carcinoma of skin  Followed by dermatology.    6. Rhinitis  Year-round symptoms, probably a combination of allergic and nonallergic causes.    7. Family history of prostate cancer  Several family members.  - PSA (Prostatic-Specific Antigen), Annual Screen    8. Wound drainage  Patient is status post ventral hernia repair, he has more inflammation than I would expect at this point, there may be a nidus of underlying infection.      PLAN:  1.  For possible wound infection, Keflex 500 mg 3 times daily ×7 days, the patient also continues to clean this area gently with soapy water.  2.  If after several weeks the patient is not having any significant improvement I would have him see his general surgeon again.  3.  Laboratory studies as above.  4.  Anticipate keeping the patient on the same medications.  5.  Patient otherwise should be seen yearly.    Orders Placed This Encounter   Procedures     Comprehensive Metabolic Panel     Lipid Cascade     Order Specific Question:   Fasting is required?     Answer:   No     PSA (Prostatic-Specific Antigen), Annual Screen     Thyroid Stimulating Hormone (TSH)     Medications Discontinued During This Encounter   Medication Reason     oxyCODONE-acetaminophen (PERCOCET) 5-325 mg per tablet Therapy completed       No Follow-up on file.    CHIEF COMPLAINT:  Chief Complaint   Patient presents with     Annual Exam     pt is fasting, site of hernia repair from 11/10/17 is still weeping       SUBJECTIVE:  Du is a 62 y.o. male presenting to the clinic for an annual physical. He is fasting today.      Health Maintenance: He has already received the influenza vaccine for the upcoming season.     REVIEW OF SYSTEMS:   He had a ventral hernia repair on 11/10/17. He states that the surgery went well as the incision sites have healed well. He does note, however, that there is a blister on the abdomen from the surgery that has been weeping. He has kept the area covered with gauze but has not been applying a cream to the afflicted site. He has no known allergies to medications. He does use Claritin as needed for his seasonal allergies. He continues to use levothyroxine for hypothyroidism. He uses Prevacid for a Schatzki ring in the throat and rarely experiences any symptoms. He sees dermatologist for a history of basal cell skin cancer. He also has eczema and was started on Kenalog cream. He has a slight tremor in the hands, particularly noticeable when bringing food towards the mouth. He continues to use a CPAP for sleep apnea. He has started to experience minimal deficits in his hearing. He denies any significant changes in his bladder habits; he gets up once in the evenings to urinate.   All other systems are negative.    PFSH:  Social: He just got back from a two week trip to [place].   Family: His father and brother have had prostate cancer. His mother had hypothyroidism and osteoporosis. One of his sisters also has sleep apnea.   Immunization History   Administered Date(s) Administered     DT (pediatric) 07/22/2003     Hep A, historic 01/01/2000     Hep B, historic 01/01/2000     Influenza [id number], 02/02/2010     Influenza, inj, historic,unspecified 01/01/2000, 10/01/2010, 11/01/2011, 10/30/2015, 11/01/2016     Influenza, seasonal,quad inj 36+ mos 10/29/2014, 10/30/2015     Influenza,seasonal, Inj IIV3 10/01/2010, 11/01/2011, 10/29/2014     Td,adult,historic,unspecified 12/05/2007     Tdap 12/05/2007, 04/23/2015     Social History     Social History     Marital status:      Spouse name: N/A     Number of  children: 1     Years of education: N/A     Occupational History      3m     Social History Main Topics     Smoking status: Never Smoker     Smokeless tobacco: Never Used     Alcohol use No      Comment: very little     Drug use: No     Sexual activity: Yes     Partners: Female     Other Topics Concern     Not on file     Social History Narrative    Diet- Regular, reduce portion sizes.        Exercise- Basketball 1x per week, Fitness center/cardio 2-4x per week     History reviewed. No pertinent past medical history.  Family History   Problem Relation Age of Onset     Prostate cancer Father      Dx 50s     Skin cancer Father      sister     Pacemaker Father      Tremor Father      Benign essential tremor     Heart attack Father 87     Prostate cancer Brother      Dx 60s     Asthma Brother      Hypothyroidism Mother      Osteoporosis Mother      Sleep apnea Sister      Asthma Sister      Scoliosis Sister      Scoliosis Sister        MEDICATIONS:  Current Outpatient Prescriptions   Medication Sig Dispense Refill     lansoprazole (PREVACID) 30 MG capsule Take 30 mg by mouth daily.       levothyroxine (SYNTHROID, LEVOTHROID) 150 MCG tablet Take 150 mcg by mouth daily.       loratadine (CLARITIN) 10 mg tablet Take 10 mg by mouth daily.        triamcinolone (KENALOG) 0.1 % cream Apply 1 application topically daily. Apply as directed to lower legs.       cephalexin (KEFLEX) 500 MG capsule Take 1 capsule (500 mg total) by mouth 3 (three) times a day for 7 days. 21 capsule 0     ibuprofen (ADVIL,MOTRIN) 200 MG tablet Take 600 mg by mouth 2 (two) times a day as needed for pain.       No current facility-administered medications for this visit.        TOBACCO USE:  History   Smoking Status     Never Smoker   Smokeless Tobacco     Never Used       VITALS:  Vitals:    12/15/17 0811   BP: 132/64   Patient Site: Right Arm   Patient Position: Sitting   Cuff Size: Adult Large   Pulse: 64   Weight: (!) 230 lb  "9.6 oz (104.6 kg)   Height: 5' 11\" (1.803 m)     Wt Readings from Last 3 Encounters:   12/15/17 (!) 230 lb 9.6 oz (104.6 kg)   11/24/17 (!) 235 lb (106.6 kg)   10/31/17 (!) 235 lb (106.6 kg)       PHYSICAL EXAM:  Constitutional:   Reveals a pleasant male that appears stated age.  Vitals: per nursing notes.  HEENT: Right Ear: External ear normal.   Left Ear: External ear normal.   Nose: Nose normal.   Mouth/Throat: Oropharynx is clear and moist.   Eyes: Conjunctivae and EOM are normal. Pupils are equal, round, and reactive to light. Right eye exhibits no discharge. Left eye exhibits no discharge.   Neck:  Supple, no carotid bruits or adenopathy.  Back:  No spine or CVA pain.  Thorax:  No bony deformities.  Lungs: Clear to A&P without rales or wheezes.  Respiratory effort normal.  Cardiac:   Regular rate and rhythm, normal S1, S2, no murmur or gallop.  Abdomen:  Ring of dark red erythema slightly raised surrounding the umbilicus, slight drainage noted, not warm to touch.   Extremities:   No peripheral edema, pulses in the feet intact.    Skin:  No jaundice, peripheral cyanosis or lesions to suggest malignancy.  Neuro:  Alert and oriented. Cranial nerves, motor, sensory exams are intact.  No gross focal deficits.  Psychiatric:  Memory intact, mood appropriate.    DATA REVIEWED:  Additional History from Old Records Summarized (2): Reviewed operative note 11/10/17; ventral hernia repair.  Decision to Obtain Records (1): None.  Radiology Tests Summarized or Ordered (1): None.  Labs Reviewed or Ordered (1): Ordered labs; lipid cascade, comprehensive metabolic panel, PSA, TSH.  Medicine Test Summarized or Ordered (1): None.  Independent Review of EKG, X-RAY, or RAPID STREP (2 each): None.     The visit lasted a total of 15 minutes face to face with the patient. Over 50% of the time was spent counseling and educating the patient about health maintenance.    Elias BENAVIDES, am scribing for and in the presence of, Dr." Tiffanie.    I, Dr. Moreno, personally performed the services described in this documentation, as scribed by Elias Murguia in my presence, and it is both accurate and complete.    Total Data Points: 3

## 2021-06-15 NOTE — TELEPHONE ENCOUNTER
Leonie from  radiology calling because patient has an xray appointment tomorrow and they need a signed order.

## 2021-06-15 NOTE — PROGRESS NOTES
Du Mayo is a 65 y.o. male who is being evaluated via a billable video visit.      How would you like to obtain your AVS? Mail a copy.   If dropped from the video visit, the video invitation should be resent by:  [phone]  Will anyone else be joining your video visit? No        Video-Visit Details    Type of service:  Video Visit  Start: 02/09/2021 08:15 am   Stop: 02/09/2021 08:33 am  Video End Time (time video stopped): 8:33 AM  Originating Location (pt. Location): Home    [facility]     Platform used for Video Visit: [employer]    This document was created using a software with less than 100% fidelity, at times resulting in unintended, even erroneous syntax and grammar.  The reader is advised to keep this under consideration while reviewing, interpreting this note.  ASSESSMENT AND PLAN:  Du Mayo 65 y.o. male is seen here on 02/09/21 for evaluation of Left hand pain, epicentered at the base of left thumb, very likely due to osteoarthritis of the first CMC joint.  This is discussed with him.  Management principles were outlined.  He is going to start offered over-the-counter measures after x-rays are taken, role of acetaminophen corticosteroid injection arthroplasty were reviewed.  We will meet here on as-needed basis.    Diagnoses and all orders for this visit:    Chronic hand pain, left  -     XR Hands Bilateral 3 or More VWS; Future; Expected date: 02/11/2021  -     XR Hands Bilateral 3 or More VWS    Localized primary osteoarthritis of left hand  -     XR Hands Bilateral 3 or More VWS; Future; Expected date: 02/11/2021  -     XR Hands Bilateral 3 or More VWS      HISTORY OF PRESENTING ILLNESS:  Du Mayo, 65 y.o., male is here for evaluation of painful left hand.  This is gone for the past 6 months.  This is a presented at the base of his thumb, and he did to the CMC area, radiating to the thenar eminence, worse with  "certain activities such as trying to open a jar putting on a tight glove, or repeated activity, he has baseline pain, he has not tried over-the-counter measures, no history of trauma, he is right hand dominant, no other joint areas are affected including the opposite number.  He has had occasional pain in the lower back perhaps once every 2 years or so for a few days.  35 years ago he was getting pain.  There is no personal or family history of psoriasis ulcerative colitis Crohn's disease.  No family history of rheumatoid arthritis.  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  He describes himself otherwise in good health.  He had a complete physical in December 2020 when this conversation about the joint pain and the thumb area came up.  He has not had any serious illness, he has had few areas of \"skin cancer\" removed though.    ALLERGIES:Patient has no known allergies.    PAST MEDICAL/ACTIVE PROBLEMS/MEDICATION/ FAMILY HISTORY/SOCIAL DATA:  The patient has a family history of  No past medical history on file.  Social History     Tobacco Use   Smoking Status Never Smoker   Smokeless Tobacco Never Used     Patient Active Problem List   Diagnosis     Benign Adenomatous Polyp Of The Large Intestine     Male Erectile Disorder     Obstructive sleep apnea     Essential tremor     Rhinitis     Hypothyroidism     Obesity     Chronic Reflux Esophagitis     Basal Cell Carcinoma Of The Skin     Eosinophilic esophagitis     Current Outpatient Medications   Medication Sig Dispense Refill     fluticasone propionate (FLONASE) 50 mcg/actuation nasal spray Apply 1 spray into each nostril daily.       fluticasone propionate (FLOVENT DISKUS INHL) Inhale. Prescribed by [facility]       ibuprofen (ADVIL,MOTRIN) 200 MG tablet Take 600 mg by mouth 2 (two) times a day as needed for pain.       lansoprazole (PREVACID) 30 MG capsule Take one pill twice daily 180 capsule 3     levothyroxine (SYNTHROID, " LEVOTHROID) 150 MCG tablet Take 1 tablet (150 mcg total) by mouth daily. 90 tablet 3     No current facility-administered medications for this visit.        COMPREHENSIVE EXAMINATION:  There were no vitals filed for this visit.  A well appearing alert oriented male. Well appearing alert oriented.   Examination of the eyes revealed no redness, obvious scleromalacia.  ENT examination shows no nasal deformity such as of saddle type, external ear without signs of inflamm/deformity ation.  Cardiopulmonary examination without obvious signs of dyspnea, no wheezing is audible, no cyanosis.  There is no finger clubbing.  Skin examination performed for heliotrope, malar area eruption periungual erythema, lupus pernio.  Neurological examination shows the speech is fluent, no facial asymmetry, muscle power in the upper extremities proximally appears to be normal.  In the psychiatric examination the memory, orientation, attention, affect were observable and normal.  Joint examination of the DIPs, PIPs, MCPs, IP joints of the thumbs, wrists, elbows, for swelling, range of motion, for shoulders range of motion evaluated.  He points to the left first CMC area to denote the site of pain, he pointed to the thenar eminence to denote the site where the pain radiates, he does not have dactylitis, he may have early squaring and early Heberden's.    LAB / IMAGING DATA:  ALT   Date Value Ref Range Status   12/19/2018 37 0 - 45 U/L Final   12/15/2017 30 0 - 45 U/L Final   12/14/2015 23 0 - 45 U/L Final     Albumin   Date Value Ref Range Status   12/19/2018 3.9 3.5 - 5.0 g/dL Final   12/15/2017 3.3 (L) 3.5 - 5.0 g/dL Final   12/14/2015 3.8 3.5 - 5.0 g/dL Final     Creatinine   Date Value Ref Range Status   12/18/2020 1.06 0.70 - 1.30 mg/dL Final   07/27/2020 1.09 0.70 - 1.30 mg/dL Final   12/30/2019 0.99 0.70 - 1.30 mg/dL Final       WBC   Date Value Ref Range Status   07/31/2020 7.6 4.0 - 11.0 thou/uL Final   07/27/2020 7.6 4.0 - 11.0  thou/uL Final     Hemoglobin   Date Value Ref Range Status   07/31/2020 12.1 (L) 14.0 - 18.0 g/dL Final   07/27/2020 12.3 (L) 14.0 - 18.0 g/dL Final   10/30/2017 12.7 (L) 14.0 - 18.0 g/dL Final     Platelets   Date Value Ref Range Status   07/31/2020 182 140 - 440 thou/uL Final   07/27/2020 181 140 - 440 thou/uL Final   10/30/2017 214 140 - 440 thou/uL Final       No results found for: NIXON, RF, SEDRATE

## 2021-06-16 PROBLEM — K20.0 EOSINOPHILIC ESOPHAGITIS: Status: ACTIVE | Noted: 2019-01-15

## 2021-06-20 NOTE — LETTER
Letter by Alphonse Moreno MD at      Author: Alphonse Moreno MD Service: -- Author Type: --    Filed:  Encounter Date: 12/31/2019 Status: Signed         Du Mayo  [address]  [facility] [address]             December 31, 2019         Dear Mr. Mayo,    Below are the results from your recent visit: TSH or thyroid is normal, stay on same dose of thyroid medication.  PSA or prostate test is normal.  Cholesterol is very well controlled.  Magnesium and vitamin B12 are normal, checked because you are on Prevacid which can affect these values.    Sugars good at 82, no diabetes.  Liver and kidney tests are normal.  Overall labs are reassuring.      Resulted Orders   Thyroid Stimulating Hormone (TSH)   Result Value Ref Range    TSH 0.79 0.30 - 5.00 uIU/mL   PSA (Prostatic-Specific Antigen), Annual Screen   Result Value Ref Range    PSA 2.4 0.0 - 4.5 ng/mL    Narrative    Method is Abbott Prostate-Specific Antigen (PSA)  Standard-WHO 1st International (90:10)   Lipid Cascade   Result Value Ref Range    Cholesterol 138 <=199 mg/dL    Triglycerides 44 <=149 mg/dL    HDL Cholesterol 41 >=40 mg/dL    LDL Calculated 88 <=129 mg/dL    Patient Fasting > 8hrs? Yes    Vitamin B12   Result Value Ref Range    Vitamin B-12 406 213 - 816 pg/mL   Magnesium   Result Value Ref Range    Magnesium 1.9 1.8 - 2.6 mg/dL   Basic Metabolic Panel   Result Value Ref Range    Sodium 141 136 - 145 mmol/L    Potassium 4.0 3.5 - 5.0 mmol/L    Chloride 108 (H) 98 - 107 mmol/L    CO2 25 22 - 31 mmol/L    Anion Gap, Calculation 8 5 - 18 mmol/L    Glucose 82 70 - 125 mg/dL    Calcium 9.1 8.5 - 10.5 mg/dL    BUN 11 8 - 22 mg/dL    Creatinine 0.99 0.70 - 1.30 mg/dL    GFR MDRD Af Amer >60 >60 mL/min/1.73m2    GFR MDRD Non Af Amer >60 >60 mL/min/1.73m2    Narrative    Fasting Glucose reference range is 70-99 mg/dL per  American Diabetes Association (ADA) guidelines.            Please call with questions or contact us using  [employer].    Sincerely,        Electronically signed by Alphonse Moreno MD

## 2021-06-21 NOTE — LETTER
Letter by Alphonse Moreno MD at      Author: Alphonse Moreno MD Service: -- Author Type: --    Filed:  Encounter Date: 12/26/2020 Status: (Other)         Du Mayo  [address] [facility] [address]             December 26, 2020         Dear Mr. Mayo,    Below are the results from your recent visit:  The PSA or prostate test is normal.  The TSH or thyroid test is a little off, but for now I am not changing your dose, but I do want you to come back during the month of February, lab only visit, to have the thyroid rechecked.    Sugar is good at 83, no diabetes.  Liver and kidney tests are normal.  Overall the cholesterol is well controlled.        Resulted Orders   PSA (Prostatic-Specific Antigen), Annual Screen   Result Value Ref Range    PSA 2.8 0.0 - 4.5 ng/mL    Narrative    Method is Abbott Prostate-Specific Antigen (PSA)  Standard-WHO 1st International (90:10)   Thyroid Stimulating Hormone (TSH)   Result Value Ref Range    TSH 0.18 (L) 0.30 - 5.00 uIU/mL   Basic Metabolic Panel   Result Value Ref Range    Sodium 142 136 - 145 mmol/L    Potassium 4.3 3.5 - 5.0 mmol/L    Chloride 105 98 - 107 mmol/L    CO2 26 22 - 31 mmol/L    Anion Gap, Calculation 11 5 - 18 mmol/L    Glucose 83 70 - 125 mg/dL    Calcium 9.2 8.5 - 10.5 mg/dL    BUN 13 8 - 22 mg/dL    Creatinine 1.06 0.70 - 1.30 mg/dL    GFR MDRD Af Amer >60 >60 mL/min/1.73m2    GFR MDRD Non Af Amer >60 >60 mL/min/1.73m2    Narrative    Fasting Glucose reference range is 70-99 mg/dL per  American Diabetes Association (ADA) guidelines.   Lipid Cascade   Result Value Ref Range    Cholesterol 116 <=199 mg/dL    Triglycerides 46 <=149 mg/dL    HDL Cholesterol 33 (L) >=40 mg/dL    LDL Calculated 74 <=129 mg/dL    Patient Fasting > 8hrs? Yes            Please call with questions or contact us using [employer].    Sincerely,        Electronically signed by Alphonse Moreno MD

## 2021-06-23 NOTE — TELEPHONE ENCOUNTER
Refill Approved    Rx renewed per Medication Renewal Policy. Medication was last renewed on 1/23/18.    Khadra Hanks, [facility] Connection Triage/Med Refill 1/29/2019     Requested Prescriptions   Pending Prescriptions Disp Refills     lansoprazole (PREVACID) 30 MG capsule [Pharmacy Med Name: LANSOPRAZOLE DR 30 MG CAPSULE] 90 capsule 3     Sig: TAKE 1 CAPSULE BY MOUTH DAILY    GI Medications Refill Protocol Passed - 1/28/2019 12:08 PM       Passed - PCP or prescribing provider visit in last 12 or next 3 months.    Last office visit with prescriber/PCP: Visit date not found OR same dept: Visit date not found OR same specialty: Visit date not found  Last physical: 12/19/2018 Last MTM visit: Visit date not found   Next visit within 3 mo: Visit date not found  Next physical within 3 mo: Visit date not found  Prescriber OR PCP: Alphonse Moreno MD  Last diagnosis associated with med order: 1. GERD (gastroesophageal reflux disease)  - lansoprazole (PREVACID) 30 MG capsule [Pharmacy Med Name: LANSOPRAZOLE DR 30 MG CAPSULE]; TAKE 1 CAPSULE BY MOUTH DAILY  Dispense: 90 capsule; Refill: 3    If protocol passes may refill for 12 months if within 3 months of last provider visit (or a total of 15 months).

## 2021-06-27 ENCOUNTER — HEALTH MAINTENANCE LETTER (OUTPATIENT)
Age: 66
End: 2021-06-27

## 2021-06-27 NOTE — PROGRESS NOTES
Progress Notes by Alphonse Moreno MD at 12/19/2018  7:50 AM     Author: Alphonse Moreno MD Service: -- Author Type: Physician    Filed: 12/19/2018  8:48 AM Encounter Date: 12/19/2018 Status: Signed    : Alphonse Moreno MD (Physician)       MALE PREVENTATIVE EXAM    Assessment and Plan:       1. Screen for colon cancer  Is due for colorectal cancer screening next spring.  - Ambulatory referral for Colonoscopy    2. Physical exam  Patient overall has good health habits.    3. Basal cell carcinoma (BCC)  By dermatology.    4. Benign neoplasm of colon  Noted on prior colonoscopy.    5. Chronic Reflux Esophagitis  Patient with a history of a Schatzki ring, and prior esophageal dilatation now patient is having symptoms consistent with stricture.  - Ambulatory Referral for Upper GI Endoscopy    6. Essential tremor  Hands affected, slight and slow progression, family history.    7. Hypothyroidism  Thyroid replacement.  - Thyroid Stimulating Hormone (TSH)    8. Obstructive sleep apnea  Nightly CPAP.    9. Non-seasonal allergic rhinitis  Claritin daily.    10. Screening for prostate cancer  Family history of prostate cancer.  - PSA (Prostatic-Specific Antigen), Annual Screen    11. Screening for lipid disorders     - Comprehensive Metabolic Panel  - Lipid Cascade    PLAN:  1.  Referral for EGD, I placed this as an urgent request, the patient needs evaluation and possible dilation.  2.  Referral for colonoscopy will probably put off in the spring.  3.  Laboratory studies as above.  4.  I explained to the patient that I would not at this time put him on a statin medication nor does he need aspirin.  5.  Patient should otherwise be seen yearly.          Next follow up:  No Follow-up on file.    Immunization Review  Adult Imm Review: No immunizations due today        I discussed the following with the patient:   Adult Healthy Living: Importance of regular exercise  Healthy nutrition        Subjective:   Chief  Complaint: Du Mayo is an 63 y.o. male here for a preventative health visit.     HPI: Patient states that he has been experiencing throat constriction which began six months ago. It feels like food gets caught in his throat but he denies any difficulty breathing. He had similar symptoms around fifteen years ago and had a Schatzki ring procedure. The procedure resolved his symptoms until six months ago.    Patient has a history of sleep apnea which he uses a CPAP machine for. His last sleep study was fifteen years ago. His wife mentioned that he has been snoring more. He thinks he may need to replace some of his CPAP supplies. He mentions that he still has a hand tremor that may be getting worse. His tremor is worse when his hand is elevated above his abdomen. He stays active by playing basketball. For the past two weeks he has had right lateral aspect knee pain when standing up from sitting and when going down the stairs.       Healthy Habits  Are you taking a daily aspirin? No  Do you typically exercising at least 40 min, 3-4 times per week?  Yes  Do you usually eat at least 4 servings of fruit and vegetables a day, include whole grains and fiber and avoid regularly eating high fat foods? Yes  Have you had an eye exam in the past two years? Yes  Do you see a dentist twice per year? Yes  Do you have any concerns regarding sleep? YES    Safety Screen  If you own firearms, are they secured in a locked gun cabinet or with trigger locks? Yes  Do you feel you are safe where you are living?: Yes (12/19/2018  7:52 AM)  Do you feel you are safe in your relationship(s)?: Yes (12/19/2018  7:52 AM)      Review of Systems:  Please see above.  The rest of the review of systems are negative for all systems.     Cancer Screening       Status Date      COLONOSCOPY Next Due 6/17/2019      Done 6/17/2014 ENDOSCOPY, COLON     Patient has more history with this topic...          Patient Care Team:  Alphonse Moreno MD as PCP  "- General  Bhanu Keith DO as Physician (General Surgery)        History     Reviewed By Date/Time Sections Reviewed    Alphonse Moreno MD 12/19/2018  8:00 AM Medical, Surgical, Tobacco, Alcohol, Drug Use, Sexual Activity, Family, Social Documentation, Socioeconomic, Lifestyle, Relationships    Julissa NeumannBETTIE 12/19/2018  7:53 AM Tobacco            Objective:   Vital Signs:   Visit Vitals  /74   Pulse (!) 58   Ht 5' 10.75\" (1.797 m)   Wt (!) 231 lb 6.4 oz (105 kg)   SpO2 98%   BMI 32.50 kg/m           PHYSICAL EXAM  Constitutional:   Reveals a L who appears healthy.  Vitals: per nursing notes.  HEENT: Right Ear: External ear normal.   Left Ear: External ear normal.   Nose: Nose normal.   Mouth/Throat: Oropharynx is clear and moist.   Eyes: Conjunctivae and EOM are normal. Pupils are equal, round, and reactive to light. Right eye exhibits no discharge. Left eye exhibits no discharge.   Neck:  Supple, no carotid bruits or adenopathy.  Back:  No spine or CVA pain.  Thorax:  No bony deformities.  Lungs: Clear to A&P without rales or wheezes.  Respiratory effort normal.  Cardiac:   Regular rate and rhythm, normal S1, S2, no murmur or gallop.  Abdomen:  Soft, active bowel sounds without bruits, mass, or tenderness.  Extremities:   No peripheral edema, pulses in the feet intact.    Skin:  No jaundice, peripheral cyanosis or lesions to suggest malignancy.  Neuro:  Alert and oriented. Cranial nerves, motor, sensory exams are intact.  No gross focal deficits.  Psychiatric:  Memory intact, mood appropriate.    ADDITIONAL HISTORY SUMMARIZED (2): None.  DECISION TO OBTAIN EXTRA INFORMATION (1): None.   RADIOLOGY TESTS (1): None.  LABS (1): Ordered labs.   MEDICINE TESTS (1): Ordered EGD.   INDEPENDENT REVIEW (2 each): None.     Total data points = 2    Total time was 20 minutes, greater than 50% counseling and coordinating care regarding the above issues.        The 10-year ASCVD risk score (Cabot CHAMP Vazquez., et " al., 2013) is: 8.4%    Values used to calculate the score:      Age: 63 years      Sex: Male      Is Non- : No      Diabetic: No      Tobacco smoker: No      Systolic Blood Pressure: 116 mmHg      Is BP treated: No      HDL Cholesterol: 33 mg/dL      Total Cholesterol: 153 mg/dL         Medication List           Accurate as of 12/19/18  8:45 AM. If you have any questions, ask your nurse or doctor.               CONTINUE taking these medications    ibuprofen 200 MG tablet  Also known as:  ADVIL,MOTRIN  INSTRUCTIONS:  Take 600 mg by mouth 2 (two) times a day as needed for pain.        lansoprazole 30 MG capsule  Also known as:  PREVACID  INSTRUCTIONS:  TAKE 1 CAPSULE BY MOUTH DAILY        levothyroxine 150 MCG tablet  Also known as:  SYNTHROID, LEVOTHROID  INSTRUCTIONS:  Take 1 tablet (150 mcg total) by mouth daily.  Doctor's comments:  PT REQUESTING REFILL        loratadine 10 mg tablet  Also known as:  CLARITIN  INSTRUCTIONS:  Take 10 mg by mouth daily.           STOP taking these medications    triamcinolone 0.1 % cream  Also known as:  KENALOG  Stopped by:  Alphonse Moreno MD            Additional Screenings Completed Today:     By signing my name below, Lui BENAVIDES, attest that this documentation has been prepared under the direction and in the presence of Dr. Alphonse Moreno.  Electronic Signature: Millie Diaz. 12/19/2018 7:56 AM.    Dr. Tiffanie BENAVIDES, personally performed the services described in this documentation. All medical record entries made by the scribe were at my direction and in my presence. I have reviewed the chart and discharge instructions (if applicable) and agree that the record reflects my personal performance and is accurate and complete.

## 2021-06-28 NOTE — PROGRESS NOTES
Progress Notes by Alphonse Moreno MD at 12/30/2019  1:30 PM     Author: Alphonse Moreno MD Service: -- Author Type: Physician    Filed: 12/30/2019  4:17 PM Encounter Date: 12/30/2019 Status: Signed    : Alphonse Moreno MD (Physician)       MALE PREVENTATIVE EXAM    Assessment and Plan:       1. Physical exam  Has good health habits.    2. Basal cell carcinoma (BCC)  Followed by dermatology.    3. Benign neoplasm of colon  She is up-to-date on colorectal cancer screening.    4. Chronic Reflux Esophagitis  Well-controlled on twice daily Prevacid.  - Vitamin B12  - Magnesium  - lansoprazole (PREVACID) 30 MG capsule; Take one pill twice daily  Dispense: 180 capsule; Refill: 3    5. Eosinophilic esophagitis  Overall well controlled.    6. Essential tremor  Perhaps a slight progression but no functional limitation.    7. Hypothyroidism  Thyroid replacement.  - Thyroid Stimulating Hormone (TSH)    8. Obstructive sleep apnea  CPAP.    9. Rhinitis  Chronic longstanding well controlled.    10. Screening for prostate cancer  Family history of  - PSA (Prostatic-Specific Antigen), Annual Screen    11. Screening for lipid disorders     - Lipid Cascade  - Basic Metabolic Panel     PLAN:  1.  Laboratory studies as above.  2.  Patient is seen regularly by dermatology..  3.  Prevacid needs to be renewed and needs to be twice daily.  4.  Patient otherwise should be seen yearly.      Next follow up:  No follow-ups on file.    Immunization Review  Adult Imm Review: No immunizations due today    I discussed the following with the patient:   Adult Healthy Living: Importance of regular exercise  Healthy nutrition        Subjective:   Chief Complaint: Du Mayo is an 64 y.o. male here for a preventative health visit.     HPI:      Esophagitis:  He continues to have occasional swallowing problems due to some residual scar tissue. Patient is currently taking prevacid. He had a Schatzki ring procedure performed 15 years  ago that resolved his symptoms until about 18 months ago.    Sleep apnea:  He continues to use a CPAP machine.     Tremor:  Patient says his wife thinks that the tremor is worsening. He says that it seems slightly worse when he raises his hand to his mouth, but otherwise seems to be the same. The tremor does not interfere with typing or writing.     BCC:  He continues to follow up with dermatology for monitoring.    Rhinitis:  He takes Claritin year round for his rhinitis.     Healthy Habits  Are you taking a daily aspirin? No  Do you typically exercising at least 40 min, 3-4 times per week?  Yes  Do you usually eat at least 4 servings of fruit and vegetables a day, include whole grains and fiber and avoid regularly eating high fat foods? Yes  Have you had an eye exam in the past two years? Yes  Do you see a dentist twice per year? Yes  Do you have any concerns regarding sleep? No    Safety Screen  If you own firearms, are they secured in a locked gun cabinet or with trigger locks? NO  Do you feel you are safe where you are living?: Yes (12/30/2019  1:54 PM)  Do you feel you are safe in your relationship(s)?: Yes (12/30/2019  1:54 PM)    Review of Systems:   +Knee pain  Denies hearing problems, vision issues   Please see above.  The rest of the review of systems are negative for all systems.     Cancer Screening       Status Date      COLONOSCOPY Next Due 6/14/2022      Done 6/14/2019      Patient has more history with this topic...          Patient Care Team:  Alphonse Moreno MD as PCP - Bhanu Mccloud DO as Physician (General Surgery)  Alphonse Moreno MD as Assigned PCP      He frequently travels to [place] for work.   History     Reviewed By Date/Time Sections Reviewed    Alphonse Moreno MD 12/30/2019  2:04 PM Medical    Alphonse Moreno MD 12/30/2019  2:01 PM Social Documentation, Relationships    Alphonse Moreno MD 12/30/2019  2:00 PM Medical, Surgical, Tobacco, Alcohol, Drug Use, Sexual  "Activity, Family, Socioeconomic, Lifestyle    HottingerMariela MA 12/30/2019  1:54 PM Tobacco          Objective:   Vital Signs:   Visit Vitals  /77   Pulse (!) 58   Ht 5' 11\" (1.803 m)   Wt (!) 224 lb (101.6 kg)   SpO2 97%   BMI 31.24 kg/m           PHYSICAL EXAM  Physical Exam  Constitutional:       Appearance: Normal appearance.   HENT:      Head: Normocephalic and atraumatic.      Right Ear: Tympanic membrane, ear canal and external ear normal.      Left Ear: Tympanic membrane, ear canal and external ear normal.      Nose: Nose normal.      Mouth/Throat:      Mouth: Mucous membranes are moist.      Pharynx: Oropharynx is clear.   Eyes:      Extraocular Movements: Extraocular movements intact.      Conjunctiva/sclera: Conjunctivae normal.      Pupils: Pupils are equal, round, and reactive to light.   Cardiovascular:      Rate and Rhythm: Normal rate and regular rhythm.      Heart sounds: Normal heart sounds.   Pulmonary:      Effort: Pulmonary effort is normal.      Breath sounds: Normal breath sounds.   Neurological:      General: No focal deficit present.      Mental Status: He is alert and oriented to person, place, and time.   Psychiatric:         Mood and Affect: Mood normal.         Behavior: Behavior normal.       The 10-year ASCVD risk score (Davion DC Jr., et al., 2013) is: 9.1%    Values used to calculate the score:      Age: 64 years      Sex: Male      Is Non- : No      Diabetic: No      Tobacco smoker: No      Systolic Blood Pressure: 127 mmHg      Is BP treated: No      HDL Cholesterol: 38 mg/dL      Total Cholesterol: 143 mg/dL         Medication List          Accurate as of December 30, 2019  4:14 PM. If you have any questions, ask your nurse or doctor.            CHANGE how you take these medications    lansoprazole 30 MG capsule  Also known as:  PREVACID  INSTRUCTIONS:  Take one pill twice daily  What changed:      how much to take    how to take this    when to " take this    additional instructions  Changed by:  Alphonse Moreno MD           CONTINUE taking these medications    ibuprofen 200 MG tablet  Also known as:  ADVIL,MOTRIN  INSTRUCTIONS:  Take 600 mg by mouth 2 (two) times a day as needed for pain.        levothyroxine 150 MCG tablet  Also known as:  SYNTHROID, LEVOTHROID  INSTRUCTIONS:  TAKE 1 TABLET BY MOUTH EVERY DAY        loratadine 10 mg tablet  Also known as:  CLARITIN  INSTRUCTIONS:  Take 10 mg by mouth daily.              Where to Get Your Medications      Information about where to get these medications is not yet available    Ask your nurse or doctor about these medications    lansoprazole 30 MG capsule         Additional Screenings Completed Today:     ADDITIONAL HISTORY SUMMARIZED (2): None.    DECISION TO OBTAIN EXTRA INFORMATION (1): None.   RADIOLOGY TESTS (1): None.  LABS (1): Labs ordered.   MEDICINE TESTS (1): None.  INDEPENDENT REVIEW (2 each): None.       The visit lasted a total of 18 minutes face to face with the patient. Over 50% of the time was spent counseling and educating the patient about esophagitis, sleep apnea, tremor, BCC, and rhinitis.    I, Gilberto Bird, am scribing for and in the presence of, Dr. Moreno.    I, Dr. Moreno, personally performed the services described in this documentation, as scribed by Gilberto Bird in my presence, and it is both accurate and complete.    Total data points: 1

## 2021-06-30 NOTE — PROGRESS NOTES
Progress Notes by Alphonse Moreno MD at 12/18/2020  9:40 AM     Author: Alphonse Moreno MD Service: -- Author Type: Physician    Filed: 12/18/2020 11:20 AM Encounter Date: 12/18/2020 Status: Signed    : Alphonse Moreno MD (Physician)        MALE PREVENTATIVE EXAM    Assessment and Plan:     Patient has been advised of split billing requirements and indicates understanding: No    1. Physical exam  The patient overall has good health habits though exercise has fallen off a bit this year.    2. Gastroesophageal reflux disease  Well-controlled on Prevacid.  - lansoprazole (PREVACID) 30 MG capsule; Take one pill twice daily  Dispense: 180 capsule; Refill: 3    3. Eosinophilic esophagitis  Followed regularly by gastroenterology.  - lansoprazole (PREVACID) 30 MG capsule; Take one pill twice daily  Dispense: 180 capsule; Refill: 3    4. Hypothyroidism     - Thyroid Stimulating Hormone (TSH)  - levothyroxine (SYNTHROID, LEVOTHROID) 150 MCG tablet; Take 1 tablet (150 mcg total) by mouth daily.  Dispense: 90 tablet; Refill: 3    5. Pain of left thumb  6 months of left thumb pain, likely osteoarthritis other could be ligamentous or tendon injury as well.  - Ambulatory referral to Rheumatology    6. Screening for prostate cancer  Family history of.  - PSA (Prostatic-Specific Antigen), Annual Screen    7. Screening for lipid disorders     - Basic Metabolic Panel  - Lipid Cascade    PLAN:  1.  For the left thumb pain, referral to rheumatology.  2.  Laboratory studies as above.  3.  Patient has regular follow-up with both gastroenterology and dermatology.  4.  Otherwise 1 year follow-up.          Next follow up:  No follow-ups on file.    Immunization Review  Adult Imm Review: No immunizations due today      I discussed the following with the patient:   Adult Healthy Living: Importance of regular exercise  Healthy nutrition        Subjective:   Chief Complaint: Du Mayo is an 65 y.o. male here for a  preventative health visit.     Patient has been advised of split billing requirements and indicates understanding: No     HPI: Patient has a known underlying history of both reflux and eosinophilic esophagitis followed by gastroenterology.  He still is on the lansoprazole twice daily but he also is using an inhaler and he is trying to get the medication essentially down into the esophagus because there is been apparently some ongoing esophageal inflammation.    Patient will have follow-up in the near future with [place] [facility].    Patient does have a history of basal cell cancer followed by dermatology.    Uses CPAP nightly which has been extremely helpful.    For the past 6 months or so he has had pain at the base of the left thumb, this was not preceded by any injury or change in his level of activity, it is starting to cause some difficulty with function, I told the patient this certainly could be arthritis which is common certainly there is possible a tendon or ligament injury but I think we should start with rheumatology.    Otherwise his comorbidities have overall been stable.    I reviewed the patient's allergies medications active medical problems past medical history past surgical history family history and social history.    Healthy Habits  Are you taking a daily aspirin? No  Do you typically exercising at least 40 min, 3-4 times per week?  Yes  Do you usually eat at least 4 servings of fruit and vegetables a day, include whole grains and fiber and avoid regularly eating high fat foods? Yes  Have you had an eye exam in the past two years? Yes  Do you see a dentist twice per year? Yes  Do you have any concerns regarding sleep? No    Safety Screen  If you own firearms, are they secured in a locked gun cabinet or with trigger locks? The patient does not own any firearms  Do you feel you are safe where you are living?: Yes (12/18/2020  9:36 AM)  Do you feel you are safe in your relationship(s)?:  "Yes (12/18/2020  9:36 AM)      Review of Systems:  Please see above.  The rest of the review of systems are negative for all systems.     Cancer Screening     Patient has no health maintenance due at this time          Patient Care Team:  Alphonse Moreno MD as PCP - Bhanu Mccloud DO as Physician (General Surgery)  Alphonse Moreno MD as Assigned PCP        History     Reviewed By Date/Time Sections Reviewed    Alphonse Moreno MD 12/18/2020  9:46 AM Medical, Surgical, Tobacco, Alcohol, Drug Use, Sexual Activity, Family, Social Documentation, Socioeconomic, Lifestyle, Relationships    Julissa Neumann [facility] 12/18/2020  9:43 AM Tobacco            Objective:   Vital Signs:   Visit Vitals  /68   Pulse (!) 58   Ht 5' 11\" (1.803 m)   Wt 215 lb 11.2 oz (97.8 kg)   BMI 30.08 kg/m           PHYSICAL EXAM  Physical Exam   Constitutional: He is oriented to person, place, and time. He appears well-developed and well-nourished.   HENT:   Head: Normocephalic and atraumatic.   Right Ear: External ear normal.   Left Ear: External ear normal.   Eyes: Pupils are equal, round, and reactive to light. Conjunctivae are normal.   Cardiovascular: Normal rate, regular rhythm and normal heart sounds.   Pulmonary/Chest: Effort normal and breath sounds normal.   Musculoskeletal: Normal range of motion.   Neurological: He is alert and oriented to person, place, and time.   Skin: Skin is warm and dry.   Psychiatric: He has a normal mood and affect. His behavior is normal. Judgment and thought content normal.         The 10-year ASCVD risk score (Davion DC Jr., et al., 2013) is: 7%    Values used to calculate the score:      Age: 65 years      Sex: Male      Is Non- : No      Diabetic: No      Tobacco smoker: No      Systolic Blood Pressure: 108 mmHg      Is BP treated: No      HDL Cholesterol: 41 mg/dL      Total Cholesterol: 138 mg/dL         Medication List          Accurate as of December 18, 2020 " 11:20 AM. If you have any questions, ask your nurse or doctor.            CONTINUE taking these medications    * fluticasone propionate 50 mcg/actuation nasal spray  Also known as: FLONASE  INSTRUCTIONS: Apply 1 spray into each nostril daily.        * FLOVENT DISKUS INHL  INSTRUCTIONS: Inhale. Prescribed by [place]        ibuprofen 200 MG tablet  Also known as: ADVIL,MOTRIN  INSTRUCTIONS: Take 600 mg by mouth 2 (two) times a day as needed for pain.        lansoprazole 30 MG capsule  Also known as: PREVACID  INSTRUCTIONS: Take one pill twice daily        levothyroxine 150 MCG tablet  Also known as: SYNTHROID, LEVOTHROID  INSTRUCTIONS: Take 1 tablet (150 mcg total) by mouth daily.            * This list has 2 medication(s) that are the same as other medications prescribed for you. Read the directions carefully, and ask your doctor or other care provider to review them with you.            STOP taking these medications    loratadine 10 mg tablet  Also known as: CLARITIN  Stopped by: Alphonse Moreno MD           Where to Get Your Medications      These medications were sent to [facility]/pharmacy #[id number] [place], [place] - [address]  [address] [address]    Phone: [phone]     lansoprazole 30 MG capsule    levothyroxine 150 MCG tablet         Additional Screenings Completed Today:

## 2021-10-17 ENCOUNTER — HEALTH MAINTENANCE LETTER (OUTPATIENT)
Age: 66
End: 2021-10-17

## 2021-11-22 ENCOUNTER — OFFICE VISIT (OUTPATIENT)
Dept: FAMILY MEDICINE | Facility: CLINIC | Age: 66
End: 2021-11-22
Payer: COMMERCIAL

## 2021-11-22 VITALS
DIASTOLIC BLOOD PRESSURE: 74 MMHG | BODY MASS INDEX: 32.52 KG/M2 | HEART RATE: 64 BPM | OXYGEN SATURATION: 97 % | SYSTOLIC BLOOD PRESSURE: 110 MMHG | WEIGHT: 233.2 LBS

## 2021-11-22 DIAGNOSIS — K21.00 GASTROESOPHAGEAL REFLUX DISEASE WITH ESOPHAGITIS, UNSPECIFIED WHETHER HEMORRHAGE: ICD-10-CM

## 2021-11-22 DIAGNOSIS — K20.0 EOSINOPHILIC ESOPHAGITIS: ICD-10-CM

## 2021-11-22 DIAGNOSIS — Z23 HIGH PRIORITY FOR 2019-NCOV VACCINE: ICD-10-CM

## 2021-11-22 DIAGNOSIS — Z12.5 SCREENING FOR PROSTATE CANCER: ICD-10-CM

## 2021-11-22 DIAGNOSIS — C44.91 BASAL CELL CARCINOMA (BCC), UNSPECIFIED SITE: ICD-10-CM

## 2021-11-22 DIAGNOSIS — E03.9 HYPOTHYROIDISM, UNSPECIFIED TYPE: ICD-10-CM

## 2021-11-22 DIAGNOSIS — Z13.220 SCREENING FOR LIPID DISORDERS: ICD-10-CM

## 2021-11-22 DIAGNOSIS — Z00.00 PHYSICAL EXAM: Primary | ICD-10-CM

## 2021-11-22 LAB
ANION GAP SERPL CALCULATED.3IONS-SCNC: 8 MMOL/L (ref 5–18)
BUN SERPL-MCNC: 12 MG/DL (ref 8–22)
CALCIUM SERPL-MCNC: 9.3 MG/DL (ref 8.5–10.5)
CHLORIDE BLD-SCNC: 108 MMOL/L (ref 98–107)
CHOLEST SERPL-MCNC: 157 MG/DL
CO2 SERPL-SCNC: 25 MMOL/L (ref 22–31)
CREAT SERPL-MCNC: 0.99 MG/DL (ref 0.7–1.3)
FASTING STATUS PATIENT QL REPORTED: ABNORMAL
GFR SERPL CREATININE-BSD FRML MDRD: 80 ML/MIN/1.73M2
GLUCOSE BLD-MCNC: 84 MG/DL (ref 70–125)
HDLC SERPL-MCNC: 37 MG/DL
LDLC SERPL CALC-MCNC: 103 MG/DL
POTASSIUM BLD-SCNC: 4.2 MMOL/L (ref 3.5–5)
PSA SERPL-MCNC: 3.32 UG/L (ref 0–4.5)
SODIUM SERPL-SCNC: 141 MMOL/L (ref 136–145)
TRIGL SERPL-MCNC: 85 MG/DL
TSH SERPL DL<=0.005 MIU/L-ACNC: 0.71 UIU/ML (ref 0.3–5)

## 2021-11-22 PROCEDURE — 80048 BASIC METABOLIC PNL TOTAL CA: CPT | Performed by: FAMILY MEDICINE

## 2021-11-22 PROCEDURE — 36415 COLL VENOUS BLD VENIPUNCTURE: CPT | Performed by: FAMILY MEDICINE

## 2021-11-22 PROCEDURE — 84443 ASSAY THYROID STIM HORMONE: CPT | Performed by: FAMILY MEDICINE

## 2021-11-22 PROCEDURE — 91306 COVID-19,PF,MODERNA (18+ YRS BOOSTER .25ML): CPT | Performed by: FAMILY MEDICINE

## 2021-11-22 PROCEDURE — 80061 LIPID PANEL: CPT | Performed by: FAMILY MEDICINE

## 2021-11-22 PROCEDURE — G0103 PSA SCREENING: HCPCS | Performed by: FAMILY MEDICINE

## 2021-11-22 PROCEDURE — 99396 PREV VISIT EST AGE 40-64: CPT | Mod: 25 | Performed by: FAMILY MEDICINE

## 2021-11-22 PROCEDURE — 0064A COVID-19,PF,MODERNA (18+ YRS BOOSTER .25ML): CPT | Performed by: FAMILY MEDICINE

## 2021-11-22 RX ORDER — LEVOTHYROXINE SODIUM 150 UG/1
150 TABLET ORAL DAILY
Qty: 90 TABLET | Refills: 3 | Status: SHIPPED | OUTPATIENT
Start: 2021-11-22 | End: 2022-11-02

## 2021-11-22 RX ORDER — LANSOPRAZOLE 30 MG/1
CAPSULE, DELAYED RELEASE ORAL
Qty: 180 CAPSULE | Refills: 3 | Status: SHIPPED | OUTPATIENT
Start: 2021-11-22 | End: 2022-11-02

## 2021-11-22 NOTE — LETTER
November 23, 2021      Du Mayo  [address] [facility] [address]        Dear ,    We are writing to inform you of your test results.  The PSA or prostate test is normal.  The TSH or thyroid test is normal, stay on same dose of thyroid medication.  Sugar is good at 84, no diabetes.  Kidney tests are normal.  Overall cholesterol is well controlled, the HDL so-called good cholesterol is slightly low but this is largely genetically determined.      Resulted Orders   PROSTATE SPEC ANTIGEN SCREEN   Result Value Ref Range    Prostate Specific Antigen Screen 3.32 0.00 - 4.50 ug/L    Narrative    Assay Method is Abbott Prostate-Specific Antigen (PSA)  Standard-WHO 1st International (90:10)   Basic metabolic panel   Result Value Ref Range    Sodium 141 136 - 145 mmol/L    Potassium 4.2 3.5 - 5.0 mmol/L    Chloride 108 (H) 98 - 107 mmol/L    Carbon Dioxide (CO2) 25 22 - 31 mmol/L    Anion Gap 8 5 - 18 mmol/L    Urea Nitrogen 12 8 - 22 mg/dL    Creatinine 0.99 0.70 - 1.30 mg/dL    Calcium 9.3 8.5 - 10.5 mg/dL    Glucose 84 70 - 125 mg/dL    GFR Estimate 80 >60 mL/min/1.73m2      Comment:      As of July 11, 2021, eGFR is calculated by the CKD-EPI creatinine equation, without race adjustment. eGFR can be influenced by muscle mass, exercise, and diet. The reported eGFR is an estimation only and is only applicable if the renal function is stable.   Lipid panel reflex to direct LDL Fasting   Result Value Ref Range    Cholesterol 157 <=199 mg/dL    Triglycerides 85 <=149 mg/dL    Direct Measure HDL 37 (L) >=40 mg/dL      Comment:      HDL Cholesterol Reference Range:     0-2 years:   No reference ranges established for patients under 2 years old  at [facility] for lipid analytes.    2-8 years:  Greater than 45 mg/dL     18 years and older:   Female: Greater than or equal to 50 mg/dL   Male:   Greater than or equal to 40 mg/dL    LDL Cholesterol Calculated 103 <=129 mg/dL    Patient Fasting > 8hrs?  Unknown    TSH   Result Value Ref Range    TSH 0.71 0.30 - 5.00 uIU/mL       If you have any questions or concerns, please call the clinic at the number listed above.       Sincerely,      Alphonse Moreno MD

## 2021-11-22 NOTE — PROGRESS NOTES
SUBJECTIVE:   CC: Du Mayo is an 66 year old male who presents for preventative health visit.   Patient has been advised of split billing requirements and indicates understanding: Yes     HPI  Patient comes in for his annual physical examination.  Patient has a history of eosinophilic esophagitis he is also had a prior esophageal dilatation he is on daily and presumed long-term Prevacid.    Patient has underlying hypothyroidism on thyroid replacement we will recheck a TSH today.    Patient does have a number of underlying comorbidities including sleep apnea these have overall been stable.  Of note he has retired within the past year he remains very physically active.    He had a mild case of Covid about a month ago he is fully recovered, he is therefore eligible for a booster shot based on timing as well as comorbidity.        Healthy Habits:     Getting at least 3 servings of Calcium per day:  Yes    Bi-annual eye exam:  Yes    Dental care twice a year:  Yes    Sleep apnea or symptoms of sleep apnea:  Sleep apnea    Diet:  Regular (no restrictions)    Frequency of exercise:  4-5 days/week    Duration of exercise:  30-45 minutes    Taking medications regularly:  Yes    Barriers to taking medications:  None    Medication side effects:  None    PHQ-2 Total Score: 0    Additional concerns today:  No      Today's PHQ-2 Score:   PHQ-2 ( 1999 Pfizer) 11/19/2021   Q1: Little interest or pleasure in doing things 0   Q2: Feeling down, depressed or hopeless 0   PHQ-2 Score 0   Q1: Little interest or pleasure in doing things Not at all   Q2: Feeling down, depressed or hopeless Not at all   PHQ-2 Score 0       Abuse: Current or Past(Physical, Sexual or Emotional)- No  Do you feel safe in your environment? Yes    Have you ever done Advance Care Planning? (For example, a Health Directive, POLST, or a discussion with a medical provider or your loved ones about your wishes): No, advance care planning information given to  patient to review.  Patient declined advance care planning discussion at this time.    Social History     Tobacco Use     Smoking status: Never Smoker     Smokeless tobacco: Never Used   Substance Use Topics     Alcohol use: Yes     Comment: Alcoholic Drinks/day: very little     If you drink alcohol do you typically have >3 drinks per day or >7 drinks per week? No    Alcohol Use 11/22/2021   Prescreen: >3 drinks/day or >7 drinks/week? -   Prescreen: >3 drinks/day or >7 drinks/week? No       Last PSA:   Prostate Specific Antigen Screen   Date Value Ref Range Status   12/18/2020 2.8 0.0 - 4.5 ng/mL Final       Reviewed orders with patient. Reviewed health maintenance and updated orders accordingly - Yes  Lab work is in process    Reviewed and updated as needed this visit by clinical staff  Tobacco  Allergies  Meds  Problems  Med Hx  Surg Hx  Fam Hx  Soc Hx         Reviewed and updated as needed this visit by Provider  Tobacco  Allergies  Meds  Problems  Med Hx  Surg Hx  Fam Hx  Soc Hx        History reviewed. No pertinent past medical history.   Past Surgical History:   Procedure Laterality Date     APPENDECTOMY       DILATE ESOPHAGUS      for a Schatze's Ring-on prevacid-trouble swallowing, several times     HERNIA REPAIR, UMBILICAL  11/2017     MOHS MICROGRAPHIC PROCEDURE       TONSILLECTOMY & ADENOIDECTOMY         Review of Systems  CONSTITUTIONAL: NEGATIVE for fever, chills, change in weight  INTEGUMENTARY/SKIN: NEGATIVE for worrisome rashes, moles or lesions  EYES: NEGATIVE for vision changes or irritation  ENT: NEGATIVE for ear, mouth and throat problems  RESP: NEGATIVE for significant cough or SOB  CV: NEGATIVE for chest pain, palpitations or peripheral edema  GI: NEGATIVE for nausea, abdominal pain, heartburn, or change in bowel habits   male: negative for dysuria, hematuria, decreased urinary stream, erectile dysfunction, urethral discharge  MUSCULOSKELETAL: NEGATIVE for significant  arthralgias or myalgia  NEURO: NEGATIVE for weakness, dizziness or paresthesias  PSYCHIATRIC: NEGATIVE for changes in mood or affect    OBJECTIVE:   /74   Pulse 64   Wt 105.8 kg (233 lb 3.2 oz)   SpO2 97%   BMI 32.52 kg/m      Physical Exam  GENERAL: healthy, alert and no distress  EYES: Eyes grossly normal to inspection, PERRL and conjunctivae and sclerae normal  HENT: ear canals and TM's normal, nose and mouth without ulcers or lesions  NECK: no adenopathy, no asymmetry, masses, or scars and thyroid normal to palpation  RESP: lungs clear to auscultation - no rales, rhonchi or wheezes  CV: regular rate and rhythm, normal S1 S2, no S3 or S4, no murmur, click or rub, no peripheral edema and peripheral pulses strong  ABDOMEN: soft, nontender, no hepatosplenomegaly, no masses and bowel sounds normal  MS: no gross musculoskeletal defects noted, no edema  SKIN: no suspicious lesions or rashes  NEURO: Normal strength and tone, mentation intact and speech normal  PSYCH: mentation appears normal, affect normal/bright    Diagnostic Test Results:  Labs reviewed in Epic    ASSESSMENT/PLAN:   (Z00.00) Physical exam  (primary encounter diagnosis)  Comment: Patient overall has very good health habits.  Plan:      (K21.00) Gastroesophageal reflux disease   Comment: Controlled with daily Prevacid  Plan: LANsoprazole (PREVACID) 30 MG DR capsule             (K20.0) Eosinophilic esophagitis  Comment: Prevacid  Plan: LANsoprazole (PREVACID) 30 MG DR capsule             (E03.9) Hypothyroidism  Comment: On thyroid replacement  Plan: levothyroxine (SYNTHROID/LEVOTHROID) 150 MCG         tablet, TSH             (Z12.5) Screening for prostate cancer  Comment: Alice history of  Plan: PROSTATE SPEC ANTIGEN SCREEN             (C44.91) Basal cell carcinoma (BCC)  Comment: Followed by dermatology  Plan:      (Z13.220) Screening for lipid disorders  Comment:    Plan: Basic metabolic panel, Lipid panel reflex to         direct LDL  "Fasting               PLAN:  1.  Booster vaccine with Moderna  2.  I renewed the patient's medications as above without changes.  3.  Laboratory studies as above  4.  Patient otherwise should be seen yearly.    Patient has been advised of split billing requirements and indicates understanding: Yes  COUNSELING:   Reviewed preventive health counseling, as reflected in patient instructions       Regular exercise       Healthy diet/nutrition    Estimated body mass index is 32.52 kg/m  as calculated from the following:    Height as of 12/18/20: 1.803 m (5' 11\").    Weight as of this encounter: 105.8 kg (233 lb 3.2 oz).     Weight management plan: Discussed healthy diet and exercise guidelines    He reports that he has never smoked. He has never used smokeless tobacco.      Counseling Resources:  ATP IV Guidelines  Pooled Cohorts Equation Calculator  FRAX Risk Assessment  ICSI Preventive Guidelines  Dietary Guidelines for Americans, 2010  USDA's MyPlate  ASA Prophylaxis  Lung CA Screening    Alphonse Moreno MD  [facility]  "

## 2022-04-26 ENCOUNTER — MEDICAL CORRESPONDENCE (OUTPATIENT)
Dept: HEALTH INFORMATION MANAGEMENT | Facility: CLINIC | Age: 67
End: 2022-04-26
Payer: COMMERCIAL

## 2022-08-16 DIAGNOSIS — K21.00 GASTROESOPHAGEAL REFLUX DISEASE WITH ESOPHAGITIS, UNSPECIFIED WHETHER HEMORRHAGE: ICD-10-CM

## 2022-08-16 DIAGNOSIS — K20.0 EOSINOPHILIC ESOPHAGITIS: ICD-10-CM

## 2022-08-16 RX ORDER — LANSOPRAZOLE 30 MG/1
CAPSULE, DELAYED RELEASE ORAL
Qty: 180 CAPSULE | Refills: 3 | Status: CANCELLED | OUTPATIENT
Start: 2022-08-16

## 2022-08-17 ENCOUNTER — TELEPHONE (OUTPATIENT)
Dept: FAMILY MEDICINE | Facility: CLINIC | Age: 67
End: 2022-08-17

## 2022-08-17 ENCOUNTER — TRANSFERRED RECORDS (OUTPATIENT)
Dept: HEALTH INFORMATION MANAGEMENT | Facility: CLINIC | Age: 67
End: 2022-08-17

## 2022-08-17 NOTE — TELEPHONE ENCOUNTER
Prior Authorization Retail Medication Request    Medication/Dose: LANsoprazole (PREVACID) 30 MG DR capsule  ICD code (if different than what is on RX):    Previously Tried and Failed:    Rationale:      Insurance Name:  [facility]  Insurance ID:  [id number]      Pharmacy Information (if different than what is on RX)  Name:  [facility]   Phone:

## 2022-08-17 NOTE — TELEPHONE ENCOUNTER
Central Prior Authorization Team   Phone: [phone]    PA Initiation    Medication: LANsoprazole (PREVACID) 30 MG DR capsule  Insurance Company: [facility] - Phone [phone] Fax [phone]  Pharmacy Filling the Rx: [facility] - [place], [place]  Filling Pharmacy Phone: [phone]  Filling Pharmacy Fax:    Start Date: 8/17/2022

## 2022-08-18 NOTE — TELEPHONE ENCOUNTER
Prior Authorization Approval    Authorization Effective Date: 5/19/2022  Authorization Expiration Date: 8/17/2023  Medication: LANsoprazole (PREVACID) 30 MG DR capsule  Approved Dose/Quantity:    Reference #:     Insurance Company: [employer] [place] - Phone [phone] Fax [phone]  Expected CoPay:       CoPay Card Available:      Foundation Assistance Needed:    Which Pharmacy is filling the prescription (Not needed for infusion/clinic administered): [facility] - [place], [place]  Pharmacy Notified: Yes  Patient Notified: Yes

## 2022-09-22 ENCOUNTER — TELEPHONE (OUTPATIENT)
Dept: FAMILY MEDICINE | Facility: CLINIC | Age: 67
End: 2022-09-22

## 2022-09-22 DIAGNOSIS — G47.33 OBSTRUCTIVE SLEEP APNEA: Primary | ICD-10-CM

## 2022-09-22 NOTE — TELEPHONE ENCOUNTER
Medication Question or Refill        What medication are you calling about (include dose and sig)?:  NEW CPAP and supplies     Controlled Substance Agreement on file:   CSA -- Patient Level:    CSA: None found at the patient level.       Who prescribed the medication?: PCP       Do you have any questions or concerns?  Patients current CPAP machine is 5+ years  Old- per pharm he needs a new one as they discontinued old one.     Preferred Pharmacy:  [facility]  Fax : [phone]

## 2022-09-26 NOTE — TELEPHONE ENCOUNTER
First of all, I would need to know when the patient had his last sleep study, I also need to know when the last time he had an evaluation by a sleep specialist.

## 2022-09-27 ENCOUNTER — PATIENT OUTREACH (OUTPATIENT)
Dept: FAMILY MEDICINE | Facility: CLINIC | Age: 67
End: 2022-09-27

## 2022-09-30 NOTE — TELEPHONE ENCOUNTER
Patient Returning Call    Reason for call:  CPAP questions    Information relayed to patient:  Pt informed that Dr. Moreno wanted to ask some questions re: CPAP    1) pt states last sleep study was early 2000's  2) 5-6 years since last time pt requested cpap machine    Patient reports to have sleep data from past 90 days if PCP would like that to evaluate.    Patient has additional questions:  No    What are your questions/concerns:  Patient would just like to have new machine and supplies ordered    Could we send this information to you in [employer] or would you prefer to receive a phone call?:   Patient would prefer a phone call   Okay to leave a detailed message?: Yes at Cell number on file:    Telephone Information:   Mobile [phone]

## 2022-10-01 ENCOUNTER — HEALTH MAINTENANCE LETTER (OUTPATIENT)
Age: 67
End: 2022-10-01

## 2022-10-03 NOTE — TELEPHONE ENCOUNTER
Patient called back with CPAP machine pressure setting. He called the CPAP company to ask.     Set point pressure: 8cm of H2O    Last sleep study early 2000's  5-6 years since patient last requested new CPAP machine.       Routing to pcp to review.       Manasa Guerrero RN

## 2022-10-03 NOTE — TELEPHONE ENCOUNTER
Outgoing Call:  Please see notes below    Pt unsure of Pressure readings, will call back when has them available.      Elissa DANIELS RN  [facility]

## 2022-11-01 ENCOUNTER — TRANSFERRED RECORDS (OUTPATIENT)
Dept: HEALTH INFORMATION MANAGEMENT | Facility: CLINIC | Age: 67
End: 2022-11-01

## 2022-11-02 ENCOUNTER — OFFICE VISIT (OUTPATIENT)
Dept: FAMILY MEDICINE | Facility: CLINIC | Age: 67
End: 2022-11-02
Payer: COMMERCIAL

## 2022-11-02 VITALS
TEMPERATURE: 98.1 F | WEIGHT: 234 LBS | BODY MASS INDEX: 32.76 KG/M2 | HEIGHT: 71 IN | HEART RATE: 57 BPM | SYSTOLIC BLOOD PRESSURE: 132 MMHG | RESPIRATION RATE: 17 BRPM | OXYGEN SATURATION: 100 % | DIASTOLIC BLOOD PRESSURE: 80 MMHG

## 2022-11-02 DIAGNOSIS — C44.91 BASAL CELL CARCINOMA (BCC), UNSPECIFIED SITE: ICD-10-CM

## 2022-11-02 DIAGNOSIS — Z00.00 WELCOME TO MEDICARE PREVENTIVE VISIT: ICD-10-CM

## 2022-11-02 DIAGNOSIS — Z12.5 ENCOUNTER FOR SCREENING FOR MALIGNANT NEOPLASM OF PROSTATE: ICD-10-CM

## 2022-11-02 DIAGNOSIS — Z11.59 NEED FOR HEPATITIS C SCREENING TEST: Primary | ICD-10-CM

## 2022-11-02 DIAGNOSIS — Z80.42 FAMILY HISTORY OF PROSTATE CANCER: ICD-10-CM

## 2022-11-02 DIAGNOSIS — K21.00 GASTROESOPHAGEAL REFLUX DISEASE WITH ESOPHAGITIS, UNSPECIFIED WHETHER HEMORRHAGE: ICD-10-CM

## 2022-11-02 DIAGNOSIS — Z23 NEED FOR VACCINATION: ICD-10-CM

## 2022-11-02 DIAGNOSIS — G47.33 OBSTRUCTIVE SLEEP APNEA: ICD-10-CM

## 2022-11-02 DIAGNOSIS — E03.9 HYPOTHYROIDISM, UNSPECIFIED TYPE: ICD-10-CM

## 2022-11-02 DIAGNOSIS — K20.0 EOSINOPHILIC ESOPHAGITIS: ICD-10-CM

## 2022-11-02 LAB
ANION GAP SERPL CALCULATED.3IONS-SCNC: 10 MMOL/L (ref 7–15)
BUN SERPL-MCNC: 14.6 MG/DL (ref 8–23)
CALCIUM SERPL-MCNC: 9.3 MG/DL (ref 8.8–10.2)
CHLORIDE SERPL-SCNC: 105 MMOL/L (ref 98–107)
CREAT SERPL-MCNC: 1 MG/DL (ref 0.67–1.17)
DEPRECATED HCO3 PLAS-SCNC: 27 MMOL/L (ref 22–29)
GFR SERPL CREATININE-BSD FRML MDRD: 84 ML/MIN/1.73M2
GLUCOSE SERPL-MCNC: 94 MG/DL (ref 70–99)
POTASSIUM SERPL-SCNC: 4.4 MMOL/L (ref 3.4–5.3)
PSA SERPL-MCNC: 3.1 NG/ML (ref 0–4.5)
SODIUM SERPL-SCNC: 142 MMOL/L (ref 136–145)
TSH SERPL DL<=0.005 MIU/L-ACNC: 0.45 UIU/ML (ref 0.3–4.2)
VIT B12 SERPL-MCNC: 521 PG/ML (ref 232–1245)

## 2022-11-02 PROCEDURE — G0009 ADMIN PNEUMOCOCCAL VACCINE: HCPCS | Performed by: FAMILY MEDICINE

## 2022-11-02 PROCEDURE — 90662 IIV NO PRSV INCREASED AG IM: CPT | Performed by: FAMILY MEDICINE

## 2022-11-02 PROCEDURE — 90750 HZV VACC RECOMBINANT IM: CPT | Performed by: FAMILY MEDICINE

## 2022-11-02 PROCEDURE — 99214 OFFICE O/P EST MOD 30 MIN: CPT | Mod: 25 | Performed by: FAMILY MEDICINE

## 2022-11-02 PROCEDURE — 80048 BASIC METABOLIC PNL TOTAL CA: CPT | Performed by: FAMILY MEDICINE

## 2022-11-02 PROCEDURE — G0008 ADMIN INFLUENZA VIRUS VAC: HCPCS | Performed by: FAMILY MEDICINE

## 2022-11-02 PROCEDURE — 90677 PCV20 VACCINE IM: CPT | Performed by: FAMILY MEDICINE

## 2022-11-02 PROCEDURE — 91313 COVID-19,PF,MODERNA BIVALENT: CPT | Performed by: FAMILY MEDICINE

## 2022-11-02 PROCEDURE — 84443 ASSAY THYROID STIM HORMONE: CPT | Performed by: FAMILY MEDICINE

## 2022-11-02 PROCEDURE — 0134A COVID-19,PF,MODERNA BIVALENT: CPT | Performed by: FAMILY MEDICINE

## 2022-11-02 PROCEDURE — 90472 IMMUNIZATION ADMIN EACH ADD: CPT | Performed by: FAMILY MEDICINE

## 2022-11-02 PROCEDURE — G0439 PPPS, SUBSEQ VISIT: HCPCS | Performed by: FAMILY MEDICINE

## 2022-11-02 PROCEDURE — 36415 COLL VENOUS BLD VENIPUNCTURE: CPT | Performed by: FAMILY MEDICINE

## 2022-11-02 PROCEDURE — 82607 VITAMIN B-12: CPT | Performed by: FAMILY MEDICINE

## 2022-11-02 PROCEDURE — G0103 PSA SCREENING: HCPCS | Performed by: FAMILY MEDICINE

## 2022-11-02 RX ORDER — DESONIDE 0.5 MG/G
OINTMENT TOPICAL
COMMUNITY
Start: 2022-11-01

## 2022-11-02 RX ORDER — LEVOTHYROXINE SODIUM 150 UG/1
150 TABLET ORAL DAILY
Qty: 90 TABLET | Refills: 3 | Status: SHIPPED | OUTPATIENT
Start: 2022-11-02 | End: 2023-10-23

## 2022-11-02 RX ORDER — LANSOPRAZOLE 30 MG/1
CAPSULE, DELAYED RELEASE ORAL
Qty: 180 CAPSULE | Refills: 3 | Status: SHIPPED | OUTPATIENT
Start: 2022-11-02 | End: 2023-11-09

## 2022-11-02 ASSESSMENT — ENCOUNTER SYMPTOMS
SORE THROAT: 0
ARTHRALGIAS: 0
JOINT SWELLING: 1
FEVER: 0
CHILLS: 0
HEARTBURN: 0
DIZZINESS: 0
HEMATURIA: 0
ABDOMINAL PAIN: 0
CONSTIPATION: 0
HEMATOCHEZIA: 0
FREQUENCY: 0
NAUSEA: 0
EYE PAIN: 0
PALPITATIONS: 0
HEADACHES: 0
MYALGIAS: 0
WEAKNESS: 0
DIARRHEA: 0
DYSURIA: 0
NERVOUS/ANXIOUS: 0
SHORTNESS OF BREATH: 0
PARESTHESIAS: 0
COUGH: 0

## 2022-11-02 ASSESSMENT — ACTIVITIES OF DAILY LIVING (ADL): CURRENT_FUNCTION: NO ASSISTANCE NEEDED

## 2022-11-02 NOTE — LETTER
November 2, 2022      Neo Mayo  [address] [facility] [address]        Dear ,    We are writing to inform you of your test results.  The TSH or thyroid test is normal, stay on same dose of thyroid medication.  The PSA or prostate test is normal  Sugar is good at 94, no diabetes.  Kidney tests are normal.  Vitamin B12 level is normal, this is checked because the heartburn medication can cause this to be abnormal      Resulted Orders   TSH   Result Value Ref Range    TSH 0.45 0.30 - 4.20 uIU/mL   PROSTATE SPEC ANTIGEN SCREEN   Result Value Ref Range    Prostate Specific Antigen Screen 3.10 0.00 - 4.50 ng/mL    Narrative    This result is obtained using the Roche Elecsys total PSA method on the erin e801 immunoassay analyzer. Results obtained with different assay methods or kits cannot be used interchangeably.   Basic metabolic panel   Result Value Ref Range    Sodium 142 136 - 145 mmol/L    Potassium 4.4 3.4 - 5.3 mmol/L    Chloride 105 98 - 107 mmol/L    Carbon Dioxide (CO2) 27 22 - 29 mmol/L    Anion Gap 10 7 - 15 mmol/L    Urea Nitrogen 14.6 8.0 - 23.0 mg/dL    Creatinine 1.00 0.67 - 1.17 mg/dL    Calcium 9.3 8.8 - 10.2 mg/dL    Glucose 94 70 - 99 mg/dL    GFR Estimate 84 >60 mL/min/1.73m2      Comment:      Effective December 21, 2021 eGFRcr in adults is calculated using the 2021 CKD-EPI creatinine equation which includes age and gender (Louis et al., NEJM, DOI: 10.1056/SBKJhk4870379)   Vitamin B12   Result Value Ref Range    Vitamin B12 521 232 - 1,245 pg/mL       If you have any questions or concerns, please call the clinic at the number listed above.       Sincerely,      Alphonse Moreno MD

## 2022-11-02 NOTE — PROGRESS NOTES
"SUBJECTIVE:   Neo is a 67 year old who presents for Preventive Visit.  Are you in the first 12 months of your Medicare coverage?  Yes,  Patient visited the eye doctor in the last 12 months    HPI  Patient comes in for his welcome to Medicare physical  Overall he has extremely good health habits he is quite physically active he is trying to watch portion sizes he has never been a smoker drinks alcohol very little and his blood pressure is well controlled.    Patient has a history of basal cell skin cancer he is followed by dermatology every 6 months for this.  Patient has a history of longstanding eosinophilic esophagitis as well as reflux he has had a dilation of the esophagus he is on Prevacid and will be on this presumably indefinitely.    Patient has a history of sleep apnea he needs CPAP supplies faxed to a medical supply company    Patient has a history of hypothyroidism on thyroid replacement we will recheck his TSH    There is a family history of prostate cancer we will check also the PSA    Has some questions and concerns about vaccinations but after discussion we will proceed with vaccines as above                Healthy Habits:     In general, how would you rate your overall health?  Good    Frequency of exercise:  2-3 days/week    Duration of exercise:  15-30 minutes    Do you usually eat at least 4 servings of fruit and vegetables a day, include whole grains    & fiber and avoid regularly eating high fat or \"junk\" foods?  No    Taking medications regularly:  Yes    Medication side effects:  Not applicable    Ability to successfully perform activities of daily living:  No assistance needed    Home Safety:  No safety concerns identified    Hearing Impairment:  No hearing concerns    In the past 6 months, have you been bothered by leaking of urine?  No    In general, how would you rate your overall mental or emotional health?  Excellent      PHQ-2 Total Score: 0    Additional concerns today:  Yes    Do you " feel safe in your environment? Yes    Have you ever done Advance Care Planning? (For example, a Health Directive, POLST, or a discussion with a medical provider or your loved ones about your wishes): Yes, advance care planning is on file.       Fall risk  Fallen 2 or more times in the past year?: No  Any fall with injury in the past year?: Yes  click delete button to remove this line now  Cognitive Screening   1) Repeat 3 items     2) Clock draw: NORMAL  3) 3 item recall: Recalls 3 objects  Results: NORMAL clock, 1-2 items recalled: COGNITIVE IMPAIRMENT LESS LIKELY    Mini-CogTM Copyright S Dian. Licensed by the author for use in [facility]; reprinted with permission (en@[facility]). All rights reserved.      Do you have sleep apnea, excessive snoring or daytime drowsiness?: yes    Reviewed and updated as needed this visit by clinical staff   Tobacco  Allergies  Meds              Reviewed and updated as needed this visit by Provider                 Social History     Tobacco Use     Smoking status: Never     Smokeless tobacco: Never   Substance Use Topics     Alcohol use: Yes     Comment: Alcoholic Drinks/day: very little         Alcohol Use 11/2/2022   Prescreen: >3 drinks/day or >7 drinks/week? No   Prescreen: >3 drinks/day or >7 drinks/week? -               Current providers sharing in care for this patient include:    Patient Care Team:  Alphonse Moreno MD as PCP - General  Alphonse Moreno MD as Assigned PCP    The following health maintenance items are reviewed in Epic and correct as of today:  Health Maintenance   Topic Date Due     ANNUAL REVIEW OF HM ORDERS  Never done     HIV SCREENING  Never done     HEPATITIS C SCREENING  Never done     HEPATITIS B IMMUNIZATION (2 of 3 - 3-dose series) 01/29/2000     COVID-19 Vaccine (4 - Booster for Moderna series) 01/17/2022     Pneumococcal Vaccine: 65+ Years (1 - PCV) Never done     AORTIC ANEURYSM SCREENING (SYSTEM ASSIGNED)  Never done      "INFLUENZA VACCINE (1) 09/01/2022     MEDICARE ANNUAL WELLNESS VISIT  11/22/2022     COLORECTAL CANCER SCREENING  08/07/2023     FALL RISK ASSESSMENT  11/02/2023     DTAP/TDAP/TD IMMUNIZATION (2 - Td or Tdap) 04/23/2025     LIPID  11/22/2026     ADVANCE CARE PLANNING  11/22/2026     PHQ-2 (once per calendar year)  Completed     IPV IMMUNIZATION  Aged Out     MENINGITIS IMMUNIZATION  Aged Out     ZOSTER IMMUNIZATION  Discontinued     Lab work is in process    Review of Systems   Constitutional: Negative for chills and fever.   HENT: Negative for congestion, ear pain, hearing loss and sore throat.    Eyes: Negative for pain and visual disturbance.   Respiratory: Negative for cough and shortness of breath.    Cardiovascular: Negative for chest pain, palpitations and peripheral edema.   Gastrointestinal: Negative for abdominal pain, constipation, diarrhea, heartburn, hematochezia and nausea.   Genitourinary: Negative for dysuria, frequency, genital sores, hematuria, impotence, penile discharge and urgency.   Musculoskeletal: Positive for joint swelling. Negative for arthralgias and myalgias.   Skin: Negative for rash.   Neurological: Negative for dizziness, weakness, headaches and paresthesias.   Psychiatric/Behavioral: Negative for mood changes. The patient is not nervous/anxious.      Constitutional, HEENT, cardiovascular, pulmonary, GI, , musculoskeletal, neuro, skin, endocrine and psych systems are negative, except as otherwise noted.    OBJECTIVE:   /80   Pulse 57   Temp 98.1  F (36.7  C) (Oral)   Resp 17   Ht 1.803 m (5' 11\")   Wt 106.1 kg (234 lb)   SpO2 100%   BMI 32.64 kg/m   Estimated body mass index is 32.64 kg/m  as calculated from the following:    Height as of this encounter: 1.803 m (5' 11\").    Weight as of this encounter: 106.1 kg (234 lb).  Physical Exam  GENERAL: healthy, alert and no distress  EYES: Eyes grossly normal to inspection, PERRL and conjunctivae and sclerae normal  HENT: ear " canals and TM's normal, nose and mouth without ulcers or lesions  NECK: no adenopathy, no asymmetry, masses, or scars and thyroid normal to palpation  RESP: lungs clear to auscultation - no rales, rhonchi or wheezes  CV: regular rate and rhythm, normal S1 S2, no S3 or S4, no murmur, click or rub, no peripheral edema and peripheral pulses strong  ABDOMEN: soft, nontender, no hepatosplenomegaly, no masses and bowel sounds normal  MS: no gross musculoskeletal defects noted, no edema  SKIN: no suspicious lesions or rashes  NEURO: Normal strength and tone, mentation intact and speech normal  PSYCH: mentation appears normal, affect normal/bright    Diagnostic Test Results:  Labs reviewed in Epic    ASSESSMENT / PLAN:       (K21.00) Gastroesophageal reflux disease   Comment: Chronic and longstanding, controlled with daily Prevacid.  Plan: LANsoprazole (PREVACID) 30 MG DR capsule, Basic        metabolic panel, Vitamin B12             (K20.0) Eosinophilic esophagitis  Comment: Known history of  Plan: LANsoprazole (PREVACID) 30 MG DR capsule             (E03.9) Hypothyroidism  Comment: On thyroid replaced  Plan: levothyroxine (SYNTHROID/LEVOTHROID) 150 MCG         tablet, TSH             (Z00.00) Welcome to Medicare preventive visit  Comment: Overall the patient does have good health habits  Plan: PROSTATE SPEC ANTIGEN SCREEN             (G47.33) Obstructive sleep apnea  Comment: CPAP  Plan:      (C44.91) Basal cell carcinoma (BCC)  Comment: Followed by dermatology  Plan:      (Z80.42) Family history of prostate cancer  Comment: Family history of numerous male relatives  Plan:      (Z23) Need for vaccination  Comment:    Plan: Pneumococcal 20 Valent Conjugate (PCV20),         COVID-19,PF,MODERNA BIVALENT 18+Yrs, ZOSTER         VACCINE RECOMBINANT ADJUVANTED IM NJX             (Z12.5) Encounter for screening for malignant neoplasm of prostate  Comment: Numerous male relatives with prostate cancer  Plan: PROSTATE SPEC ANTIGEN  "SCREEN             PLAN:  1.  High-dose influenza vaccine, COVID booster shot, pneumonia 20 vaccine, and begin shingles vaccine  2.  Laboratory studies as above  3.  CPAP supplies will be sent to the patient's preferred medical supply company.  4.  Patient otherwise should be seen yearly      Patient has been advised of split billing requirements and indicates understanding: Yes      COUNSELING:  Reviewed preventive health counseling, as reflected in patient instructions       Regular exercise       Healthy diet/nutrition    Estimated body mass index is 32.64 kg/m  as calculated from the following:    Height as of this encounter: 1.803 m (5' 11\").    Weight as of this encounter: 106.1 kg (234 lb).    Weight management plan: Discussed healthy diet and exercise guidelines    He reports that he has never smoked. He has never used smokeless tobacco.      Appropriate preventive services were discussed with this patient, including applicable screening as appropriate for cardiovascular disease, diabetes, osteopenia/osteoporosis, and glaucoma.  As appropriate for age/gender, discussed screening for colorectal cancer, prostate cancer, breast cancer, and cervical cancer. Checklist reviewing preventive services available has been given to the patient.    Reviewed patients plan of care and provided an AVS. The Intermediate Care Plan ( asthma action plan, low back pain action plan, and migraine action plan) for Du meets the Care Plan requirement. This Care Plan has been established and reviewed with the Patient.    Counseling Resources:  ATP IV Guidelines  Pooled Cohorts Equation Calculator  Breast Cancer Risk Calculator  Breast Cancer: Medication to Reduce Risk  FRAX Risk Assessment  ICSI Preventive Guidelines  Dietary Guidelines for Americans, 2010  [employer]'s MyPlate  ASA Prophylaxis  Lung CA Screening    Alphonse Moreno MD  [facility]    Identified Health Risks:  "

## 2022-12-07 ENCOUNTER — TRANSFERRED RECORDS (OUTPATIENT)
Dept: HEALTH INFORMATION MANAGEMENT | Facility: CLINIC | Age: 67
End: 2022-12-07

## 2022-12-09 ENCOUNTER — TRANSFERRED RECORDS (OUTPATIENT)
Dept: HEALTH INFORMATION MANAGEMENT | Facility: CLINIC | Age: 67
End: 2022-12-09

## 2023-01-09 ENCOUNTER — TRANSFERRED RECORDS (OUTPATIENT)
Dept: HEALTH INFORMATION MANAGEMENT | Facility: CLINIC | Age: 68
End: 2023-01-09

## 2023-05-03 NOTE — TELEPHONE ENCOUNTER
5-3-23    FYI - Status Update    Who is Calling: patient    Update: pt called stated [employer] never received the faxed DME script for his CPAP from his last call w/ us in Oct.  Pt looking to personally  the paper DME script so he can personally go to [facility] himself in person to pick this up.      Does caller want a call/response back: Yes     Could we send this information to you in [employer] or would you prefer to receive a phone call?:   Patient would prefer a phone call   Okay to leave a detailed message?: Yes at Cell number on file:    Telephone Information:   Mobile [phone]

## 2023-05-04 NOTE — TELEPHONE ENCOUNTER
Patient gave fax number to fax over instead. I faxed CPAP DME to [facility] [employer] @ [phone].

## 2023-05-17 NOTE — TELEPHONE ENCOUNTER
Patient called to ask PCP to fax CPAP DME order again.  I confirmed fax number and faxed it again.    [phone] [facility]    Mago Keller on 5/17/2023 at 11:01 AM

## 2023-05-23 ENCOUNTER — TELEPHONE (OUTPATIENT)
Dept: FAMILY MEDICINE | Facility: CLINIC | Age: 68
End: 2023-05-23
Payer: COMMERCIAL

## 2023-05-23 ENCOUNTER — MEDICAL CORRESPONDENCE (OUTPATIENT)
Dept: HEALTH INFORMATION MANAGEMENT | Facility: CLINIC | Age: 68
End: 2023-05-23

## 2023-05-23 DIAGNOSIS — G47.33 OBSTRUCTIVE SLEEP APNEA (ADULT) (PEDIATRIC): Primary | ICD-10-CM

## 2023-05-23 NOTE — TELEPHONE ENCOUNTER
[facility] calling because they need an updated cpap script and office notes pertaining to the cpap and states that pt is currently using and benefits from machine. Please sign and date everything for medicare.    Fax:[phone]

## 2023-06-05 ENCOUNTER — MEDICAL CORRESPONDENCE (OUTPATIENT)
Dept: HEALTH INFORMATION MANAGEMENT | Facility: CLINIC | Age: 68
End: 2023-06-05
Payer: COMMERCIAL

## 2023-08-21 ENCOUNTER — OFFICE VISIT (OUTPATIENT)
Dept: FAMILY MEDICINE | Facility: CLINIC | Age: 68
End: 2023-08-21
Payer: COMMERCIAL

## 2023-08-21 VITALS
WEIGHT: 232 LBS | HEART RATE: 85 BPM | OXYGEN SATURATION: 96 % | BODY MASS INDEX: 32.36 KG/M2 | SYSTOLIC BLOOD PRESSURE: 130 MMHG | DIASTOLIC BLOOD PRESSURE: 74 MMHG

## 2023-08-21 DIAGNOSIS — T75.3XXA MOTION SICKNESS, INITIAL ENCOUNTER: Primary | ICD-10-CM

## 2023-08-21 DIAGNOSIS — G47.33 OBSTRUCTIVE SLEEP APNEA: ICD-10-CM

## 2023-08-21 PROCEDURE — 99214 OFFICE O/P EST MOD 30 MIN: CPT | Performed by: FAMILY MEDICINE

## 2023-08-21 RX ORDER — SCOLOPAMINE TRANSDERMAL SYSTEM 1 MG/1
1 PATCH, EXTENDED RELEASE TRANSDERMAL
Qty: 10 PATCH | Refills: 0 | Status: SHIPPED | OUTPATIENT
Start: 2023-08-21 | End: 2023-11-09

## 2023-08-28 ENCOUNTER — TELEPHONE (OUTPATIENT)
Dept: FAMILY MEDICINE | Facility: CLINIC | Age: 68
End: 2023-08-28
Payer: COMMERCIAL

## 2023-08-28 NOTE — TELEPHONE ENCOUNTER
General Call    Contacts         Type Contact Phone/Fax    08/28/2023 01:58 PM CDT Phone (Incoming) Neo Mayo (Self) [phone] (M)          Reason for Call:  Prior authorization set up    What are your questions or concerns:    Pt[facility] says that [facility] is requiring a prior authorization for:  scopolamine (TRANSDERM) 1 MG/3DAYS 72 hr patch     Pt is leaving out of town in Sept, please look into this.     Date of last appointment with provider: 08/21/23    Could we send this information to you in [employer] or would you prefer to receive a phone call?:   Patient would prefer a phone call   Okay to leave a detailed message?: No at Cell number on file:    Telephone Information:   Mobile [phone]

## 2023-08-28 NOTE — TELEPHONE ENCOUNTER
Central Prior Authorization Team   Phone: [phone]    PA Initiation    Medication: Transderm Scop (1.5 MG) 1MG/3DAYS 72 hr patches  Insurance Company: [employer] [place] - Phone [phone] Fax [phone]  Pharmacy Filling the Rx: [facility] - [place], [place] - [address] [place] [address]  Filling Pharmacy Phone: [phone]  Filling Pharmacy Fax:    Start Date: 8/28/2023

## 2023-08-29 NOTE — TELEPHONE ENCOUNTER
Prior Authorization Approval    Authorization Effective Date: 5/30/2023  Authorization Expiration Date: 8/28/2024  Medication: Transderm Scop (1.5 MG) 1MG/3DAYS 72 hr patches  Approved Dose/Quantity:    Reference #:     Insurance Company: [employer] [place] - Phone [phone] Fax [phone]  Expected CoPay:       CoPay Card Available:      Foundation Assistance Needed:    Which Pharmacy is filling the prescription (Not needed for infusion/clinic administered): [facility] - [address]  Pharmacy Notified: Yes  Patient Notified: Yes

## 2023-09-25 NOTE — PROGRESS NOTES
"  Assessment & Plan     (T75.3XXA) Motion sickness, initial encounter  (primary encounter diagnosis)  Comment: Patient has a tendency towards motion sickness  Plan: scopolamine (TRANSDERM) 1 MG/3DAYS 72 hr patch             (G47.33) Obstructive sleep apnea  Comment: CPAP  Plan: Adult Sleep Eval & Management          Referral             PLAN:  Scopolamine patches faxed to his mail order pharmacy  Referral to sleep medicine  Patient will be seeing me later in the fall for his annual physical             BMI:   Estimated body mass index is 32.36 kg/m  as calculated from the following:    Height as of 11/2/22: 1.803 m (5' 11\").    Weight as of this encounter: 105.2 kg (232 lb).           Alphonse Moreno MD  [facility]   Neo is a 68 year old, presenting for the following health issues:  Patient comes in with 2 distinct concerns.  He and his wife are going to be going on a [place] in [place] or so, and he is worried about motion sickness is in this looking for the patches.  Of note when he is written on a ferry or on rides he has on occasion gotten some motion sickness so I think it is reasonable for him to be treated.  I discussed with the patient the use of the scopolamine patch.    Patient was diagnosed with sleep apnea probably 20 years ago or so and has not been evaluated in quite some time I told the patient if its been perhaps as long as 20 years and I do think he should see a sleep specialist again to verify the diagnosis to make sure his CPAP machine is functioning effectively and so forth.          No chief complaint on file.      8/21/2023     1:28 PM   Additional Questions   Roomed by Benita   Accompanied by self       History of Present Illness       Reason for visit:  We are going on our first cruise and im susceptible to motion sickness. I would like a Rx for a patch as a preventative measure.    He eats 2-3 servings of " fruits and vegetables daily.He consumes 0 sweetened beverage(s) daily.He exercises with enough effort to increase his heart rate 30 to 60 minutes per day.  He exercises with enough effort to increase his heart rate 5 days per week.   He is taking medications regularly.               Review of Systems         Objective    There were no vitals taken for this visit.  There is no height or weight on file to calculate BMI.  Physical Exam                          Detail Level: Detailed Size Of Lesion: 1 X Size Of Lesion In Cm (Optional): 0.8 Incorporate Mauc In Note: Yes

## 2023-10-22 DIAGNOSIS — E03.9 HYPOTHYROIDISM, UNSPECIFIED TYPE: ICD-10-CM

## 2023-10-23 RX ORDER — LEVOTHYROXINE SODIUM 150 UG/1
150 TABLET ORAL DAILY
Qty: 90 TABLET | Refills: 0 | Status: SHIPPED | OUTPATIENT
Start: 2023-10-23 | End: 2024-01-22

## 2023-11-08 ENCOUNTER — TRANSFERRED RECORDS (OUTPATIENT)
Dept: HEALTH INFORMATION MANAGEMENT | Facility: CLINIC | Age: 68
End: 2023-11-08
Payer: COMMERCIAL

## 2023-11-09 ENCOUNTER — TELEPHONE (OUTPATIENT)
Dept: FAMILY MEDICINE | Facility: CLINIC | Age: 68
End: 2023-11-09

## 2023-11-09 ENCOUNTER — OFFICE VISIT (OUTPATIENT)
Dept: FAMILY MEDICINE | Facility: CLINIC | Age: 68
End: 2023-11-09
Payer: COMMERCIAL

## 2023-11-09 VITALS
HEIGHT: 71 IN | DIASTOLIC BLOOD PRESSURE: 80 MMHG | WEIGHT: 237 LBS | SYSTOLIC BLOOD PRESSURE: 136 MMHG | OXYGEN SATURATION: 97 % | BODY MASS INDEX: 33.18 KG/M2 | HEART RATE: 51 BPM

## 2023-11-09 DIAGNOSIS — J31.0 CHRONIC RHINITIS: ICD-10-CM

## 2023-11-09 DIAGNOSIS — Z12.5 SCREENING FOR PROSTATE CANCER: ICD-10-CM

## 2023-11-09 DIAGNOSIS — K21.00 GASTROESOPHAGEAL REFLUX DISEASE WITH ESOPHAGITIS, UNSPECIFIED WHETHER HEMORRHAGE: ICD-10-CM

## 2023-11-09 DIAGNOSIS — E66.811 CLASS 1 OBESITY DUE TO EXCESS CALORIES WITHOUT SERIOUS COMORBIDITY WITH BODY MASS INDEX (BMI) OF 33.0 TO 33.9 IN ADULT: ICD-10-CM

## 2023-11-09 DIAGNOSIS — Z23 NEED FOR VACCINATION: ICD-10-CM

## 2023-11-09 DIAGNOSIS — G47.33 OBSTRUCTIVE SLEEP APNEA: ICD-10-CM

## 2023-11-09 DIAGNOSIS — Z00.00 WELLNESS EXAMINATION: ICD-10-CM

## 2023-11-09 DIAGNOSIS — Z13.220 SCREENING FOR LIPID DISORDERS: ICD-10-CM

## 2023-11-09 DIAGNOSIS — K20.0 EOSINOPHILIC ESOPHAGITIS: ICD-10-CM

## 2023-11-09 DIAGNOSIS — G25.0 ESSENTIAL TREMOR: ICD-10-CM

## 2023-11-09 DIAGNOSIS — Z29.11 NEED FOR VACCINATION AGAINST RESPIRATORY SYNCYTIAL VIRUS: ICD-10-CM

## 2023-11-09 DIAGNOSIS — E06.3 HYPOTHYROIDISM DUE TO HASHIMOTO'S THYROIDITIS: ICD-10-CM

## 2023-11-09 DIAGNOSIS — E66.09 CLASS 1 OBESITY DUE TO EXCESS CALORIES WITHOUT SERIOUS COMORBIDITY WITH BODY MASS INDEX (BMI) OF 33.0 TO 33.9 IN ADULT: ICD-10-CM

## 2023-11-09 DIAGNOSIS — K21.00 GASTROESOPHAGEAL REFLUX DISEASE WITH ESOPHAGITIS WITHOUT HEMORRHAGE: ICD-10-CM

## 2023-11-09 DIAGNOSIS — Z12.11 SCREEN FOR COLON CANCER: Primary | ICD-10-CM

## 2023-11-09 DIAGNOSIS — Z23 NEED FOR SHINGLES VACCINE: ICD-10-CM

## 2023-11-09 LAB
ALBUMIN SERPL BCG-MCNC: 4.1 G/DL (ref 3.5–5.2)
ALP SERPL-CCNC: 84 U/L (ref 40–129)
ALT SERPL W P-5'-P-CCNC: 34 U/L (ref 0–70)
ANION GAP SERPL CALCULATED.3IONS-SCNC: 7 MMOL/L (ref 7–15)
AST SERPL W P-5'-P-CCNC: 21 U/L (ref 0–45)
BILIRUB SERPL-MCNC: 0.4 MG/DL
BUN SERPL-MCNC: 14.1 MG/DL (ref 8–23)
CALCIUM SERPL-MCNC: 9.2 MG/DL (ref 8.8–10.2)
CHLORIDE SERPL-SCNC: 108 MMOL/L (ref 98–107)
CHOLEST SERPL-MCNC: 137 MG/DL
CREAT SERPL-MCNC: 1.13 MG/DL (ref 0.67–1.17)
DEPRECATED HCO3 PLAS-SCNC: 28 MMOL/L (ref 22–29)
EGFRCR SERPLBLD CKD-EPI 2021: 72 ML/MIN/1.73M2
GLUCOSE SERPL-MCNC: 88 MG/DL (ref 70–99)
HDLC SERPL-MCNC: 31 MG/DL
LDLC SERPL CALC-MCNC: 82 MG/DL
NONHDLC SERPL-MCNC: 106 MG/DL
POTASSIUM SERPL-SCNC: 4.6 MMOL/L (ref 3.4–5.3)
PROT SERPL-MCNC: 6.8 G/DL (ref 6.4–8.3)
PSA SERPL DL<=0.01 NG/ML-MCNC: 3.34 NG/ML (ref 0–4.5)
SODIUM SERPL-SCNC: 143 MMOL/L (ref 135–145)
TRIGL SERPL-MCNC: 121 MG/DL
TSH SERPL DL<=0.005 MIU/L-ACNC: 1.78 UIU/ML (ref 0.3–4.2)

## 2023-11-09 PROCEDURE — 90662 IIV NO PRSV INCREASED AG IM: CPT | Performed by: FAMILY MEDICINE

## 2023-11-09 PROCEDURE — G0439 PPPS, SUBSEQ VISIT: HCPCS | Performed by: FAMILY MEDICINE

## 2023-11-09 PROCEDURE — G0103 PSA SCREENING: HCPCS | Performed by: FAMILY MEDICINE

## 2023-11-09 PROCEDURE — 80061 LIPID PANEL: CPT | Performed by: FAMILY MEDICINE

## 2023-11-09 PROCEDURE — G0008 ADMIN INFLUENZA VIRUS VAC: HCPCS | Performed by: FAMILY MEDICINE

## 2023-11-09 PROCEDURE — 36415 COLL VENOUS BLD VENIPUNCTURE: CPT | Performed by: FAMILY MEDICINE

## 2023-11-09 PROCEDURE — 80053 COMPREHEN METABOLIC PANEL: CPT | Performed by: FAMILY MEDICINE

## 2023-11-09 PROCEDURE — 84443 ASSAY THYROID STIM HORMONE: CPT | Performed by: FAMILY MEDICINE

## 2023-11-09 RX ORDER — LANSOPRAZOLE 30 MG/1
CAPSULE, DELAYED RELEASE ORAL
Qty: 180 CAPSULE | Refills: 3 | Status: SHIPPED | OUTPATIENT
Start: 2023-11-09 | End: 2024-03-04

## 2023-11-09 RX ORDER — RESPIRATORY SYNCYTIAL VIRUS VACCINE 120MCG/0.5
0.5 KIT INTRAMUSCULAR ONCE
Qty: 1 EACH | Refills: 0 | Status: SHIPPED | OUTPATIENT
Start: 2023-11-09 | End: 2023-11-09

## 2023-11-09 ASSESSMENT — ENCOUNTER SYMPTOMS
NERVOUS/ANXIOUS: 0
EYE PAIN: 0
DIARRHEA: 0
HEMATURIA: 0
COUGH: 0
SORE THROAT: 0
FREQUENCY: 0
PARESTHESIAS: 0
PALPITATIONS: 0
FEVER: 0
CHILLS: 0
ARTHRALGIAS: 0
MYALGIAS: 0
WEAKNESS: 0
HEADACHES: 0
HEMATOCHEZIA: 0
CONSTIPATION: 0
JOINT SWELLING: 0
ABDOMINAL PAIN: 0
SHORTNESS OF BREATH: 0
DYSURIA: 0
DIZZINESS: 0
HEARTBURN: 0
NAUSEA: 0

## 2023-11-09 ASSESSMENT — ACTIVITIES OF DAILY LIVING (ADL): CURRENT_FUNCTION: NO ASSISTANCE NEEDED

## 2023-11-09 NOTE — TELEPHONE ENCOUNTER
"Message [facility]: \"We are processing a prescription for Lansoprazole 30 MG DR Take 1 capsule twice a day. The patient's plan will only cover a  maximum of 1 capsule per day at the strength without a prior authorization.\"      Medication: LANsoprazole (PREVACID) 30 MG DR capsule       Recommend Prior Authorization?      Please advise!   "

## 2023-11-09 NOTE — PROGRESS NOTES
"SUBJECTIVE:   Neo is a 68 year old who presents for Preventive Visit.      11/9/2023     7:55 AM   Additional Questions   Roomed by Gloria ROBERTS       Are you in the first 12 months of your Medicare coverage?  No    HPI:  Patient comes in for his annual Medicare wellness examination.  The patient will obtain the flu shot here he declines COVID, he will finish the shingles vaccine at his local pharmacy I also recommend the RSV vaccine    Patient has a history of both of chronic reflux as well as eosinophilic esophagitis he has had a dilation of the distal esophagus there is a known Schatzki ring, he will be on chronic PPI therapy.    Patient has a history of underlying sleep apnea he has not been evaluated for perhaps 15 years or so I did refer him to sleep medicine but he will see them in December.    Patient has a history of chronic rhinitis he takes over-the-counter antihistamines for this.    Patient has a history of colon polyps his last colonoscopy was in 2020 and a 3-year follow-up was recommended so I will put in the order for a repeat colonoscopy    Patient also has a history of basal cell skin cancer followed by dermatology yearly    Overall the patient has extremely good health habits he is very active physically.              Healthy Habits:     In general, how would you rate your overall health?  Excellent    Frequency of exercise:  2-3 days/week    Duration of exercise:  45-60 minutes    Do you usually eat at least 4 servings of fruit and vegetables a day, include whole grains    & fiber and avoid regularly eating high fat or \"junk\" foods?  Yes    Taking medications regularly:  Yes    Medication side effects:  None    Ability to successfully perform activities of daily living:  No assistance needed    Home Safety:  No safety concerns identified    Hearing Impairment:  Difficulty following a conversation in a noisy restaurant or crowded room, need to ask people to speak up or repeat themselves and " difficulty understanding soft or whispered speech    In the past 6 months, have you been bothered by leaking of urine?  No    In general, how would you rate your overall mental or emotional health?  Excellent    Additional concerns today:  No      Today's PHQ-2 Score:       11/9/2023     7:58 AM   PHQ-2 ( 1999 [employer])   Q1: Little interest or pleasure in doing things 0   Q2: Feeling down, depressed or hopeless 0   PHQ-2 Score 0   Q1: Little interest or pleasure in doing things Not at all   Q2: Feeling down, depressed or hopeless Not at all   PHQ-2 Score 0           Have you ever done Advance Care Planning? (For example, a Health Directive, POLST, or a discussion with a medical provider or your loved ones about your wishes): No, advance care planning information given to patient to review.  Advanced care planning was discussed at today's visit.       Fall risk  Fallen 2 or more times in the past year?: No  Any fall with injury in the past year?: No    Cognitive Screening   1) Repeat 3 items   2) Clock draw: NORMAL  3) 3 item recall: Recalls 1 object   Results: NORMAL clock, 1-2 items recalled: COGNITIVE IMPAIRMENT LESS LIKELY    Mini-CogTM Copyright CASPER Biggs. Licensed by the author for use in [facility]; reprinted with permission (en@[facility]). All rights reserved.      Do you have sleep apnea, excessive snoring or daytime drowsiness? : yes    Reviewed and updated as needed this visit by clinical staff   Tobacco  Allergies  Meds  Problems             Reviewed and updated as needed this visit by Provider                 Social History     Tobacco Use    Smoking status: Never    Smokeless tobacco: Never   Substance Use Topics    Alcohol use: Yes     Comment: Alcoholic Drinks/day: very little             11/9/2023     7:58 AM   Alcohol Use   Prescreen: >3 drinks/day or >7 drinks/week? Not Applicable     Do you have a current opioid prescription? No  Do you use any other controlled substances or  medications that are not prescribed by a provider? None         Current providers sharing in care for this patient include:     Patient Care Team:  Alphonse Moreno MD as PCP - General (Family Medicine)  Alphonse Moreno MD as Assigned PCP    The following health maintenance items are reviewed in Epic and correct as of today:  Health Maintenance   Topic Date Due    ANNUAL REVIEW OF HM ORDERS  Never done    RSV VACCINE (Pregnancy & 60+) (1 - 1-dose 60+ series) Never done    COLORECTAL CANCER SCREENING  08/07/2023    INFLUENZA VACCINE (1) 09/01/2023    COVID-19 Vaccine (5 - 2023-24 season) 09/01/2023    MEDICARE ANNUAL WELLNESS VISIT  11/02/2023    TSH W/FREE T4 REFLEX  11/02/2023    FALL RISK ASSESSMENT  11/09/2024    DTAP/TDAP/TD IMMUNIZATION (3 - Td or Tdap) 04/23/2025    LIPID  11/22/2026    ADVANCE CARE PLANNING  11/02/2027    PHQ-2 (once per calendar year)  Completed    Pneumococcal Vaccine: 65+ Years  Completed    IPV IMMUNIZATION  Aged Out    HPV IMMUNIZATION  Aged Out    MENINGITIS IMMUNIZATION  Aged Out    RSV MONOCLONAL ANTIBODY  Aged Out    HEPATITIS C SCREENING  Discontinued    ZOSTER IMMUNIZATION  Discontinued    AORTIC ANEURYSM SCREENING (SYSTEM ASSIGNED)  Discontinued     Lab work is in process          Review of Systems   Constitutional:  Negative for chills and fever.   HENT:  Positive for hearing loss. Negative for congestion, ear pain and sore throat.    Eyes:  Negative for pain and visual disturbance.   Respiratory:  Negative for cough and shortness of breath.    Cardiovascular:  Negative for chest pain, palpitations and peripheral edema.   Gastrointestinal:  Negative for abdominal pain, constipation, diarrhea, heartburn, hematochezia and nausea.   Genitourinary:  Negative for dysuria, frequency, genital sores, hematuria, impotence, penile discharge and urgency.   Musculoskeletal:  Negative for arthralgias, joint swelling and myalgias.   Skin:  Negative for rash.   Neurological:  Negative for  "dizziness, weakness, headaches and paresthesias.   Psychiatric/Behavioral:  Negative for mood changes. The patient is not nervous/anxious.      Constitutional, HEENT, cardiovascular, pulmonary, GI, , musculoskeletal, neuro, skin, endocrine and psych systems are negative, except as otherwise noted.    OBJECTIVE:   /80   Pulse 51   Ht 1.791 m (5' 10.5\")   Wt 107.5 kg (237 lb)   SpO2 97%   BMI 33.53 kg/m   Estimated body mass index is 33.53 kg/m  as calculated from the following:    Height as of this encounter: 1.791 m (5' 10.5\").    Weight as of this encounter: 107.5 kg (237 lb).  Physical Exam  GENERAL: healthy, alert and no distress  EYES: Eyes grossly normal to inspection, PERRL and conjunctivae and sclerae normal  HENT: ear canals and TM's normal, nose and mouth without ulcers or lesions  NECK: no adenopathy, no asymmetry, masses, or scars and thyroid normal to palpation  RESP: lungs clear to auscultation - no rales, rhonchi or wheezes  CV: regular rate and rhythm, normal S1 S2, no S3 or S4, no murmur, click or rub, no peripheral edema and peripheral pulses strong  ABDOMEN: soft, nontender, no hepatosplenomegaly, no masses and bowel sounds normal  MS: no gross musculoskeletal defects noted, no edema  SKIN: no suspicious lesions or rashes  NEURO: Normal strength and tone, mentation intact and speech normal  PSYCH: mentation appears normal, affect normal/bright    Diagnostic Test Results:  Labs reviewed in Epic    ASSESSMENT / PLAN:   (Z12.11) Screen for colon cancer  (primary encounter diagnosis)  Comment: Patient with a history of polyps is due for repeat colonoscopy  Plan: Colonoscopy Screening  Referral             (Z29.11) Need for vaccination against respiratory syncytial virus  Comment: Recommend at his local pharmacy  Plan: respiratory syncytial virus vaccine, bivalent         (ABRYSVO) injection             (E03.8,  E06.3) Hypothyroidism  Comment: On thyroid replacement  Plan: TSH      "        (Z00.00) Wellness examination  Comment: Overall the patient has extremely good health habits  Plan:      (G25.0) Essential tremor  Comment: Longstanding stable  Plan:      (J31.0) Chronic rhinitis  Comment: Trolled with OTC medication  Plan:      (E66.09,  Z68.33) Obesity (BMI)   Comment: BMI greater than 30  Plan:        (G47.33) Obstructive sleep apnea  Comment: On CPAP  Plan:      (Z12.5) Screening for prostate cancer  Comment: Family history of  Plan: PROSTATE SPEC ANTIGEN SCREEN              (Z23) Need for vaccination  Comment:    Plan: INFLUENZA VACCINE 65+ (FLUZONE HD)             (Z13.220) Screening for lipid disorders  Comment:    Plan: Comprehensive metabolic panel, Lipid panel         reflex to direct LDL Fasting             (K21.00) Gastroesophageal reflux disease   Comment: Controlled with Prevacid  Plan: LANsoprazole (PREVACID) 30 MG DR capsule             (K20.0) Eosinophilic esophagitis  Comment: Controlled with Prevacid, recommend indefinite treatment  Plan: LANsoprazole (PREVACID) 30 MG DR capsule             (Z23) Need for shingles vaccine  Comment: Needs at his local pharmacy  Plan:      PLAN:  Influenza vaccine  Orders faxed to his local pharmacy for the shingles vaccine as well as RSV  Laboratory studies as above  Continue the patient on his same medications  Patient has a follow-up with sleep medicine in December  Referral for colonoscopy  Patient otherwise should be seen yearly          COUNSELING:  Reviewed preventive health counseling, as reflected in patient instructions       Regular exercise       Healthy diet/nutrition        He reports that he has never smoked. He has never used smokeless tobacco.      Appropriate preventive services were discussed with this patient, including applicable screening as appropriate for fall prevention, nutrition, physical activity, Tobacco-use cessation, weight loss and cognition.  Checklist reviewing preventive services available has been given to  the patient.    Reviewed patients plan of care and provided an AVS. The Intermediate Care Plan ( asthma action plan, low back pain action plan, and migraine action plan) for Du meets the Care Plan requirement. This Care Plan has been established and reviewed with the Patient.          Alphonse Moreno MD  [facility]    Identified Health Risks:  I have reviewed Opioid Use Disorder and Substance Use Disorder risk factors and made any needed referrals.

## 2023-11-09 NOTE — LETTER
November 13, 2023      Neo FRANCES Mayo  [address] [address] [facility] [address]        Dear ,    We are writing to inform you of your test results.  The PSA or prostate test is normal  The TSH or thyroid test is normal, stay on same dose of thyroid medication  Sugar is good at 88, you do not have diabetes  Liver and kidney tests are normal  Overall the cholesterol is well controlled, just slightly low HDL so-called good cholesterol but this is largely genetically determined    See us again in 1 year        Resulted Orders   PROSTATE SPEC ANTIGEN SCREEN   Result Value Ref Range    Prostate Specific Antigen Screen 3.34 0.00 - 4.50 ng/mL    Narrative    This result is obtained using the Roche Elecsys total PSA method on the erin e801 immunoassay analyzer. Results obtained with different assay methods or kits cannot be used interchangeably.   TSH   Result Value Ref Range    TSH 1.78 0.30 - 4.20 uIU/mL   Comprehensive metabolic panel   Result Value Ref Range    Sodium 143 135 - 145 mmol/L      Comment:      Reference intervals for this test were updated on 09/26/2023 to more accurately reflect our healthy population. There may be differences in the flagging of prior results with similar values performed with this method. Interpretation of those prior results can be made in the context of the updated reference intervals.     Potassium 4.6 3.4 - 5.3 mmol/L    Carbon Dioxide (CO2) 28 22 - 29 mmol/L    Anion Gap 7 7 - 15 mmol/L    Urea Nitrogen 14.1 8.0 - 23.0 mg/dL    Creatinine 1.13 0.67 - 1.17 mg/dL    GFR Estimate 72 >60 mL/min/1.73m2    Calcium 9.2 8.8 - 10.2 mg/dL    Chloride 108 (H) 98 - 107 mmol/L    Glucose 88 70 - 99 mg/dL    Alkaline Phosphatase 84 40 - 129 U/L    AST 21 0 - 45 U/L      Comment:      Reference intervals for this test were updated on 6/12/2023 to more accurately reflect our healthy population. There may be differences in the flagging of prior results with similar values performed with this  method. Interpretation of those prior results can be made in the context of the updated reference intervals.    ALT 34 0 - 70 U/L      Comment:      Reference intervals for this test were updated on 6/12/2023 to more accurately reflect our healthy population. There may be differences in the flagging of prior results with similar values performed with this method. Interpretation of those prior results can be made in the context of the updated reference intervals.      Protein Total 6.8 6.4 - 8.3 g/dL    Albumin 4.1 3.5 - 5.2 g/dL    Bilirubin Total 0.4 <=1.2 mg/dL   Lipid panel reflex to direct LDL Fasting   Result Value Ref Range    Cholesterol 137 <200 mg/dL    Triglycerides 121 <150 mg/dL    Direct Measure HDL 31 (L) >=40 mg/dL    LDL Cholesterol Calculated 82 <=100 mg/dL    Non HDL Cholesterol 106 <130 mg/dL    Narrative    Cholesterol  Desirable:  <200 mg/dL    Triglycerides  Normal:  Less than 150 mg/dL  Borderline High:  150-199 mg/dL  High:  200-499 mg/dL  Very High:  Greater than or equal to 500 mg/dL    Direct Measure HDL  Female:  Greater than or equal to 50 mg/dL   Male:  Greater than or equal to 40 mg/dL    LDL Cholesterol  Desirable:  <100mg/dL  Above Desirable:  100-129 mg/dL   Borderline High:  130-159 mg/dL   High:  160-189 mg/dL   Very High:  >= 190 mg/dL    Non HDL Cholesterol  Desirable:  130 mg/dL  Above Desirable:  130-159 mg/dL  Borderline High:  160-189 mg/dL  High:  190-219 mg/dL  Very High:  Greater than or equal to 220 mg/dL       If you have any questions or concerns, please call the clinic at the number listed above.       Sincerely,      Alphonse Moreno MD

## 2023-11-13 ENCOUNTER — TELEPHONE (OUTPATIENT)
Dept: FAMILY MEDICINE | Facility: CLINIC | Age: 68
End: 2023-11-13
Payer: COMMERCIAL

## 2023-11-13 ASSESSMENT — SLEEP AND FATIGUE QUESTIONNAIRES
HOW LIKELY ARE YOU TO NOD OFF OR FALL ASLEEP WHILE SITTING QUIETLY AFTER LUNCH WITHOUT ALCOHOL: SLIGHT CHANCE OF DOZING
HOW LIKELY ARE YOU TO NOD OFF OR FALL ASLEEP WHILE SITTING AND TALKING TO SOMEONE: WOULD NEVER DOZE
HOW LIKELY ARE YOU TO NOD OFF OR FALL ASLEEP IN A CAR, WHILE STOPPED FOR A FEW MINUTES IN TRAFFIC: WOULD NEVER DOZE
HOW LIKELY ARE YOU TO NOD OFF OR FALL ASLEEP WHEN YOU ARE A PASSENGER IN A CAR FOR AN HOUR WITHOUT A BREAK: SLIGHT CHANCE OF DOZING
HOW LIKELY ARE YOU TO NOD OFF OR FALL ASLEEP WHILE SITTING AND READING: SLIGHT CHANCE OF DOZING
HOW LIKELY ARE YOU TO NOD OFF OR FALL ASLEEP WHILE SITTING INACTIVE IN A PUBLIC PLACE: SLIGHT CHANCE OF DOZING
HOW LIKELY ARE YOU TO NOD OFF OR FALL ASLEEP WHILE WATCHING TV: SLIGHT CHANCE OF DOZING
HOW LIKELY ARE YOU TO NOD OFF OR FALL ASLEEP WHILE LYING DOWN TO REST IN THE AFTERNOON WHEN CIRCUMSTANCES PERMIT: SLIGHT CHANCE OF DOZING

## 2023-11-13 NOTE — TELEPHONE ENCOUNTER
Typical maximum dose is 1 capsule (30 mg) daily, but given patient's eosinophilic esophagitis, they may cover higher dose. Will submit prior authorization.       Melva Palencia, PharmD, BCACP  Medication Management (MTM) Pharmacist  [facility]

## 2023-11-13 NOTE — TELEPHONE ENCOUNTER
[facility] PA REQUEST  --- quantity exception    Please route outcome to [facility]: Melva Palencia PharmD    Prior Authorization Retail Medication Request    Medication/Dose: lansoprazole 30 mg twice daily   ICD code (if different than what is on RX):    Previously Tried and Failed:    Rationale:  Patient requires dose of 30 mg twice daily. Patient has a history of both chronic reflux as well as eosinophilic esophagitis. He has had a dilation of the distal esophagus. There is a known Schatzki ring.    Insurance Name:  [employer] Medicare  Insurance ID:  [id number]       Pharmacy Information (if different than what is on RX)  Name:    Phone:

## 2023-11-16 NOTE — TELEPHONE ENCOUNTER
Central Prior Authorization Team  Phone: [phone]    PA Initiation    Medication: LANSOPRAZOLE 30 MG PO CPDR  Insurance Company: [employer] - Phone [phone] Fax [phone]  Pharmacy Filling the Rx: [facility] - [place], [place] - [address] [place] [address]  Filling Pharmacy Phone: [phone]  Filling Pharmacy Fax:    Start Date: 11/16/2023

## 2023-11-16 NOTE — TELEPHONE ENCOUNTER
Prior Authorization Approval    Medication: LANSOPRAZOLE 30 MG PO CPDR  Authorization Effective Date: 8/18/2023  Authorization Expiration Date: 11/16/2024  Approved Dose/Quantity:   Reference #:     Insurance Company: [employer] - Phone [phone] Fax [phone]  Expected CoPay: $    CoPay Card Available:      Financial Assistance Needed:   Which Pharmacy is filling the prescription: [facility] - [address]  Pharmacy Notified: No  Patient Notified: Yes- informed pt of pa approval.

## 2023-11-20 ENCOUNTER — OFFICE VISIT (OUTPATIENT)
Dept: SLEEP MEDICINE | Facility: CLINIC | Age: 68
End: 2023-11-20
Attending: FAMILY MEDICINE
Payer: COMMERCIAL

## 2023-11-20 VITALS
WEIGHT: 243.8 LBS | HEART RATE: 56 BPM | BODY MASS INDEX: 34.13 KG/M2 | DIASTOLIC BLOOD PRESSURE: 77 MMHG | HEIGHT: 71 IN | OXYGEN SATURATION: 97 % | SYSTOLIC BLOOD PRESSURE: 140 MMHG

## 2023-11-20 DIAGNOSIS — G47.33 OBSTRUCTIVE SLEEP APNEA: ICD-10-CM

## 2023-11-20 DIAGNOSIS — G47.33 OSA ON CPAP: Primary | ICD-10-CM

## 2023-11-20 PROCEDURE — 99204 OFFICE O/P NEW MOD 45 MIN: CPT | Performed by: STUDENT IN AN ORGANIZED HEALTH CARE EDUCATION/TRAINING PROGRAM

## 2023-11-20 NOTE — PATIENT INSTRUCTIONS
MY INFORMATION ON SLEEP APNEA-  Du Mayo    DOCTOR : Gus Quan MD  SLEEP CENTER :      MY CONTACT NUMBER:    [facility]  [phone]  [facility]      For general sleep health questions:   http://sleepeducation.org    For tips about PAP and COVID-19:  [email or web address]://www.[email or web address].org/patients/patient-resources/resources/covid-19-and-home-pap-therapy.pdf    For general info about COVID-19 including vaccines:  [email or web address]      Continue PAP therapy every night, for all hours that you are sleeping (including naps.)  As always, try to get at least 8 hours of sleep or more each day, keep a regular sleep schedule, and avoid sleep deprivation. Avoid alcohol.  Reasons that you might need a change to your pressure therapy would be weight gain or loss, waking having inadvertently removed your PAP overnight, having previously felt refreshed by sleep with CPAP use and now waking un-refreshed, and return of daytime sleepiness. Also, the development of new medical problems  (such as heart failure, stroke, medications such as narcotics) can sometimes affect breathing at night and change your PAP therapy needs.  Please bring PAP with you if you are hospitalized.  If anticipating surgery be sure to discuss with your surgeon that you have sleep apnea and use PAP therapy.    Maintain your equipment as recommended which includes routine cleaning and replacement of supplies.      Call DME for any questions regarding supplies or maintenance.    [place] [facility], [facility] [phone]    Do not drive on engage in potentially dangerous activities if feeling sleepy.  Please follow up in sleep clinic again in 3 months.        Tips for your PAP use-    Mask fitting tips  Mask fitting exercise:    To improve your mask seal and your mobility at night, put mask on and secure in place.  Lie down in bed with full pressure and  roll to one side, adjust headgear while in that position to eliminate any leaks. Repeat process rolling to other side.     The mask seal does not have to be perfect:   CPAP machines are designed to make up for small leaks. However, you will not tolerate leaks blowing in your eyes so you will need to adjust.   Any leak should only be near or at the bottom of the mask.  We expect your mask to leak slightly at night.    Do not over-tighten the headgear straps, tighter IS NOT better, we expect minimal leak.    First try re-positioning the mask or headgear before tightening the headgear straps.  Mask leaks are expected due to changing sleeping positions. Try pulling the mask away from your skin allowing the cushion to re-inflate will minimize the leak.  If you struggle for a good fit, try turning the CPAP off and then readjust the mask by pulling it away from your face and then turning back on the CPAP.        Humidifier tips  Humidifiers can be adjusted to increase or decrease the amount of moisture according to your comfort level. You may need to adjust this frequently at first, but then might only change it with seasonal weather changes.     Try INCREASING the humidity if:  You experience a dry, irritated nasal passage or throat.  You have a runny, drippy nose or sneezing fits after using CPAP.  You experience nasal congestion during or after CPAP use.    Try DECREASING the humidity if:  You have excessive condensation or  rain out  in the tubing or mask.  Otherwise keep the tubing warm during the night by running it underneath the blankets or pillow.      Clinic visit after initial PAP set-up   Bring your equipment with you to your 5-8 week follow up clinic visit.  We will be extracting your data from the machine if not available from the cloud based [employer]pw.        Travel  Always take your equipment with you when you travel.  If you fly with your equipment bring it on with you as a carry on.  Medical equipment does  not count as a carry on.    If you travel international the machines take 110-240v.  The only adapter needed is the adapter that will fit into the receptacle (outlet).    You may also want to bring an extension cord as many hotel rooms have limited outlets at the bedside.  Do not travel with water in your humidifier chamber.     Cleaning and Maintenance Guidelines    Equipment Frequency Cleaning Method   Mask First Day    Daily      Weekly Soak mask in hot soapy water for 30 minutes, rinse and air dry.  Wipe nasal cushion with a hot soapy (Ivory, baby shampoo) cloth and rinse.  Baby wipes may also be used.  Do not use anti-bacterial soaps,Myrtle  liquid soap, rubbing alcohol, bleach or ammonia.  Wash frame in hot soapy water (Ivory, baby shampoo) rinse and let air dry   Headgear Biweekly Wash in hot soapy water, rinse and air dry   Reusable Gray Filter Weekly Wash in hot soapy water, rinse, put in towel squeeze moisture out, let air dry   Disposable White Filter Check Weekly Replace when brown or gray in color; at least every 2 to 3 months   Humidifier Chamber Daily    Weekly Empty distilled water from humidifier and let air dry    Hand wash in hot soapy water, rinse and air dry   Tubing Weekly Wash in hot soapy water, rinse and let air dry   Mask, Tubing and Humidifier Chamber As needed Disinfect: Soak in 1 part distilled white vinegar to 3 parts hot water for 30 minutes, rinse well and air dry  Not the material headgear        MASK AND SUPPLY REORDERING and EQUIPMENT NEEDS through your DME and per your insurance  Reminder: Most insurance companies will allow for a new mask, headgear, tubing, and reusable gray filter every six months.  Disposable white ultra-fine filters are covered monthly.      HOME AND SAFETY INSTRUCTIONS  Do not use frayed or cracked electrical cords, multi plug adaptors, or switched receptacles  Do not immerse electrical equipment into water  Assure that electrical cords do not become a tripping  hazard

## 2023-11-20 NOTE — NURSING NOTE
"Chief Complaint   Patient presents with    Sleep Apnea       Initial BP (!) 140/77   Pulse 56   Ht 1.791 m (5' 10.5\")   Wt 110.6 kg (243 lb 12.8 oz)   SpO2 97%   BMI 34.49 kg/m   Estimated body mass index is 34.49 kg/m  as calculated from the following:    Height as of this encounter: 1.791 m (5' 10.5\").    Weight as of this encounter: 110.6 kg (243 lb 12.8 oz).    Medication Reconciliation: complete    Neck circumference:     DME: NNEKA Levine MA  "

## 2023-11-20 NOTE — ASSESSMENT & PLAN NOTE
Du Mayo has Moderate Sleep apnea. He is tolerating PAP well and reports adequate compliance with PAP therapy. Daytime symptoms are improved and reports positive benefits with PAP use.   JORGE is adequately controlled with Auto CPAP at the current settings per compliance DL.   Prescription provided for renewal of PAP device and supplies.  Recommended him/her to continue using the CPAP regularly during sleep and instructed  to get  the supplies for the PAP replaced regularly.    Patient instructed to remember to bring PAP with him/her if hospitalized and if anticipating procedure that requires sedation/surgery to be sure to discuss with the provider/surgeon that he/she has sleep apnea and uses PAP therapy.

## 2023-11-20 NOTE — PROGRESS NOTES
[facility]  Consultation Note    Name: Du Mayo MRN#: [id number]   Age: 68 year old YOB: 1955     Date of Consultation: 11/20/23  Consultation is requested by: Alphonse Moreno  Primary care provider: Alphonse Moreno MD    History of Present Illness:   Du Mayo is a 68 year old male patient with Hypothyroidism and GERD    He is sent by Alphonse Moreno for a sleep consultation regarding Sleep Apnea  .    Du Mayo main reason for visit: It has been 15-20 years since my previous sleep study assigned CPAP settings.  Are my settings correct now? Is another treatment option now available?  Patient states problem(s) started: My whole life.  Du Mayo's goals for this visit: Confirm or update my CPAP settings. Finalize a decision on continued use of a CPAP or on use of another treatment option.  If CPAP is the best choice, I need a new CPAP machine as my current machine is close to 10 years old.    He has had a previous sleep study about 20 years ago. Important findings: AHI 20.6.    Do you use a CPAP Machine at home: Yes  DME: [facility]  Overall, on a scale of 0-10 how would you rate your CPAP (0 poor, 10 great):    What type of mask do you use: Nasal Pillow  Is your mask comfortable: Yes  Is your mask leaking: No  Do you notice snoring with mask on: No  Do you notice gasping arousals with mask on: No  Are you having significant oral or nasal dryness: No  Is the pressure setting comfortable: Yes  Do you feel well rested in the morning: Yes    [employer]   CPAP 8 cmH2O 30 day usage data:  100% of days with > 4 hours of use. 0/30 days with no use.   Average use 6 hours 49 minutes per day.   95%ile Leak 0.3 L/min.   AHI 0.7 events per hour.                Assessment and Plan:     Problem List Items Addressed This Visit          Respiratory    JORGE on CPAP - Primary     Du Mayo has Moderate Sleep apnea. He is tolerating PAP well and reports adequate  compliance with PAP therapy. Daytime symptoms are improved and reports positive benefits with PAP use.   JORGE is adequately controlled with Auto CPAP at the current settings per compliance DL.   Prescription provided for renewal of PAP supplies.  Recommended him/her to continue using the CPAP regularly during sleep and instructed  to get  the supplies for the PAP replaced regularly.    Patient instructed to remember to bring PAP with him/her if hospitalized and if anticipating procedure that requires sedation/surgery to be sure to discuss with the provider/surgeon that he/she has sleep apnea and uses PAP therapy.           Relevant Orders    Comprehensive DME (Completed)       SLEEP-WAKE SCHEDULE:   Work/School Days: Patient goes to school/work: No   Usually gets into bed at Variable:  11PM - 12:30PM  Takes patient about <5 Minutes to fall asleep  Has trouble falling asleep 0-1 nights per week  Wakes up in the middle of the night 3-4 times.  Wakes up due to Other  He has trouble falling back asleep None times a week.   It usually takes <1 minute to get back to sleep  Patient is usually up at Varies: 6AM-8:30AM  Uses alarm: Yes  Sleep Inertia: No    Weekends/Non-work Days/All Other Days:  Usually gets into bed at Varies: 11PM-12:30AM   Takes patient about <5 minutes to fall asleep  Patient is usually up at Varies: 6AM-8:30AM  Uses alarm: Yes    Sleep Need  Patient gets  6.5 hours sleep on average   Patient thinks He needs about 7 hours sleep    Du Mayo prefers to sleep in this position(s): Side   Patient states they do the following activities in bed:      Naps  Patient takes a purposeful nap 2 times a week and naps are usually 30-45 minutes in duration  He feels better after a nap: Yes  He dozes off unintentionally 1 days per week  Patient has had a driving accident or near-miss due to sleepiness/drowsiness: No      SLEEP DISRUPTIONS:  Breathing/Snoring  Patient snores:Yes  Other people complain about His  snoring: Yes  Patient has been told He stops breathing in His sleep:No  He has issues with the following:      Movement:  Patient gets pain, discomfort, with an urge to move:  No  It happens when He is resting:  No  It happens more at night:  No  Patient has been told Du Mayo kicks His legs at night:  No     Behaviors in Sleep:  Du Mayo has experienced the following behaviors while sleeping: Teeth grinding  He has experienced sudden muscle weakness during the day: No       Is there anything else you would like your sleep provider to know: When using the CPAP, I do not snore. If I doze while sitting up, I do not snore. If I sleep without the CPAP, while laying down, I snore the whole time - breathing in and out. I have a condition where I only breathe through 1 nostril at a time.      CAFFEINE AND OTHER SUBSTANCES:  Patient consumes caffeinated beverages per day:  3-4  Last caffeine use is usually: 5:30-6PM  List of any prescribed or over the counter stimulants that patient takes: None  List of any prescribed or over the counter sleep medication patient takes: None  List of previous sleep medications that patient has tried: None  Patient drinks alcohol to help them sleep: No  Patient drinks alcohol near bedtime: No    Family History:  Patient has a family member been diagnosed with a sleep disorder: Yes  Sister         SCALES:  EPWORTH SLEEPINESS SCALE       11/13/2023    11:49 AM    [place] Sleepiness Scale ( DELANEY Moser  [id number]<br>ESS - [place]/English - Final version - 21 Nov 07 - [facility] [place].)   Sitting and reading Slight chance of dozing   Watching TV Slight chance of dozing   Sitting, inactive in a public place (e.g. a theatre or a meeting) Slight chance of dozing   As a passenger in a car for an hour without a break Slight chance of dozing   Lying down to rest in the afternoon when circumstances permit Slight chance of dozing   Sitting and talking to someone Would never doze  "  Sitting quietly after a lunch without alcohol Slight chance of dozing   In a car, while stopped for a few minutes in traffic Would never doze   Elmont Score (MC) 6   Elmont Score (Sleep) 6       INSOMNIA SEVERITY INDEX (GONZALO)        11/13/2023    11:10 AM   Insomnia Severity Index (GONZALO)   Difficulty falling asleep 0   Difficulty staying asleep 1   Problems waking up too early 1   How SATISFIED/DISSATISFIED are you with your CURRENT sleep pattern? 1   How NOTICEABLE to others do you think your sleep problem is in terms of impairing the quality of your life? 1   How WORRIED/DISTRESSED are you about your current sleep problem? 2   To what extent do you consider your sleep problem to INTERFERE with your daily functioning (e.g. daytime fatigue, mood, ability to function at work/daily chores, concentration, memory, mood, etc.) CURRENTLY? 1   GONZALO Total Score 7    Guidelines for Scoring/Interpretation:  Total score categories:  0-7 = No clinically significant insomnia   8-14 = Subthreshold insomnia   15-21 = Clinical insomnia (moderate severity)  22-28 = Clinical insomnia (severe)  Used via courtesy of www.AisleBuyerth.va.gov with permission from Lui White PhD., [facility]      STOP BANG   JORGE High Risk            Total Score: 5    JORGE: Snores loudly    JORGE: BMI over 35 kg/m2    JORGE: Over 50 ys old    JORGE: Neck Circum >16 in    JORGE: Male          GAD7       No data to display                  CAGE-AID       No data to display              CAGE-AID reprinted with permission from the [place] Medical Journal, RAHDA Lucas. and SEBASTIEN Suazo, \"Conjoint screening questionnaires for alcohol and drug abuse\" [place] Medical Journal 94: 135-140, 1995.      PATIENT HEALTH QUESTIONNAIRE-9 (PHQ - 9)      12/18/2020     9:36 AM   PHQ-9 ([employer])   1.  Little interest or pleasure in doing things 0   2.  Feeling down, depressed, or hopeless 0     Developed by Fany Wallace, Concepción Galindo, William Corral and " colleagues, with an educational conchita from [employer]. No permission required to reproduce, translate, display or distribute.      Allergies:    Allergies   Allergen Reactions    Cats     Seasonal Allergies        Medications:    Current Outpatient Medications   Medication Sig Dispense Refill    LANsoprazole (PREVACID) 30 MG DR capsule [LANSOPRAZOLE (PREVACID) 30 MG CAPSULE] Take one pill twice daily 180 capsule 3    levothyroxine (SYNTHROID/LEVOTHROID) 150 MCG tablet TAKE 1 TABLET DAILY 90 tablet 0    desonide (DESOWEN) 0.05 % external ointment  (Patient not taking: Reported on 11/20/2023)         Problem List:  Patient Active Problem List    Diagnosis Date Noted    Eosinophilic esophagitis 01/15/2019     Priority: Medium     Diagnosed January 2019 via EGD        JORGE on CPAP      Priority: Medium     Dx around 2003  on CPAP  was snoring        Essential tremor      Priority: Medium      Gradually   worse-dad had  hands mainly-some fine dexterity        Rhinitis      Priority: Medium     Probably a combination of allergic and nonallergic causes.    hayfever-seasonal-year round symptoms  zyrtec and Claritin both daily-most of year        Benign Adenomatous Polyp Of The Large Intestine      Priority: Medium    Male Erectile Disorder      Priority: Medium     Cialis prn  Testosterone level-2011-        Hypothyroidism      Priority: Medium     On thyroid replacement      Basal Cell Carcinoma Of The Skin      Priority: Medium     Jaw, neck-four different areas  Moh's  Dermatology q six months  Triamcinolone for dry skin after biopsies        Obesity      Priority: Medium     BMI over 30          Chronic Reflux Esophagitis      Priority: Medium     Prevacid daily-recommend lifelong  [id number]-Bcecptti-Shatki's ring            Past Medical/Surgical History:  No past medical history on file.  Past Surgical History:   Procedure Laterality Date    APPENDECTOMY      DILATE ESOPHAGUS      for a Schatze's Ring-on prevacid-trouble  swallowing, several times    HERNIA REPAIR, UMBILICAL  11/2017    MOHS MICROGRAPHIC PROCEDURE      TONSILLECTOMY & ADENOIDECTOMY         Social History:  Social History     Socioeconomic History    Marital status:      Spouse name: Not on file    Number of children: 1    Years of education: Not on file    Highest education level: Not on file   Occupational History    Occupation: [employer]     Comment: Retired   Tobacco Use    Smoking status: Never    Smokeless tobacco: Never   Vaping Use    Vaping Use: Never used   Substance and Sexual Activity    Alcohol use: Yes     Comment: Alcoholic Drinks/day: very little    Drug use: No    Sexual activity: Yes     Partners: Female   Other Topics Concern    Not on file   Social History Narrative    Diet- Regular, reduce portion sizes.            Exercise- Basketball 1x per week, Fitness center/cardio 2-4x per week, golf, biking, hiking, skiing,      Social Determinants of Health     Financial Resource Strain: Low Risk  (11/9/2023)    Financial Resource Strain     Within the past 12 months, have you or your family members you live with been unable to get utilities (heat, electricity) when it was really needed?: No   Food Insecurity: Low Risk  (11/9/2023)    Food Insecurity     Within the past 12 months, did you worry that your food would run out before you got money to buy more?: No     Within the past 12 months, did the food you bought just not last and you didn t have money to get more?: No   Transportation Needs: Low Risk  (11/9/2023)    Transportation Needs     Within the past 12 months, has lack of transportation kept you from medical appointments, getting your medicines, non-medical meetings or appointments, work, or from getting things that you need?: No   Physical Activity: Not on file   Stress: Not on file   Social Connections: Not on file   Interpersonal Safety: Low Risk  (11/9/2023)    Interpersonal Safety     Do you feel physically and emotionally  "safe where you currently live?: Yes     Within the past 12 months, have you been hit, slapped, kicked or otherwise physically hurt by someone?: No     Within the past 12 months, have you been humiliated or emotionally abused in other ways by your partner or ex-partner?: No   Housing Stability: Low Risk  (11/9/2023)    Housing Stability     Do you have housing? : Yes     Are you worried about losing your housing?: No       Family History:  Family History   Problem Relation Age of Onset    Hypothyroidism Mother     Osteoporosis Mother     Prostate Cancer Father         Dx 50s    Skin Cancer Father         sister    Pacemaker Father     Tremor Father         Benign essential tremor    Coronary Artery Disease Father 87    Sleep Apnea Sister     Asthma Sister     Scoliosis Sister     Scoliosis Sister     Prostate Cancer Brother         Dx 60s    Asthma Brother     Tremor Brother        Review of Systems:   A complete review of systems reviewed by me is negative with the exeption of what has been mentioned in the history of present illness.  In the last TWO WEEKS have you experienced any of the following symptoms?  Fevers: No  Night Sweats: No  Weight Gain: No  Pain at Night: No  Double Vision: No  Changes in Vision: No  Difficulty Breathing through Nose: No  Sore Throat in Morning: No  Dry Mouth in the Morning: No  Shortness of Breath Lying Flat: No  Shortness of Breath With Activity: Yes  Awakening with Shortness of Breath: No  Increased Cough: No  Heart Racing at Night: No  Swelling in Feet or Legs: No  Diarrhea at Night: No  Heartburn at Night: No  Urinating More than Once at Night: No  Losing Control of Urine at Night: No  Joint Pains at Night: No  Headaches in Morning: No  Weakness in Arms or Legs: No  Depressed Mood: No  Anxiety: No     Physical Exam:   Vitals: BP (!) 140/77   Pulse 56   Ht 1.791 m (5' 10.5\")   Wt 110.6 kg (243 lb 12.8 oz)   SpO2 97%   BMI 34.49 kg/m    BMI= Body mass index is 34.49 kg/m .   " "  GENERAL: healthy, alert, no distress, and elderly  EYES: Eyes grossly normal to inspection.  No discharge or erythema, or obvious scleral/conjunctival abnormalities.  RESP: No audible wheeze, cough, or visible cyanosis.  No visible retractions or increased work of breathing.    SKIN: Visible skin clear. No significant rash, abnormal pigmentation or lesions.  NEURO: Cranial nerves grossly intact.  Mentation and speech appropriate for age.  PSYCH: Mentation appears normal, affect normal/bright, judgement and insight intact, normal speech and appearance well-groomed.         Data: All pertinent previous laboratory data reviewed     Recent Labs   Lab Test 11/09/23  0849 11/02/22  1108    142   POTASSIUM 4.6 4.4   CHLORIDE 108* 105   CO2 28 27   ANIONGAP 7 10   GLC 88 94   BUN 14.1 14.6   CR 1.13 1.00   THERESE 9.2 9.3       Recent Labs   Lab Test 07/31/20  1321   WBC 7.6   RBC 4.23*   HGB 12.1*   HCT 36.9*   MCV 87   MCH 28.7   MCHC 32.8   RDW 14.8*          Recent Labs   Lab Test 11/09/23  0849   PROTTOTAL 6.8   ALBUMIN 4.1   BILITOTAL 0.4   ALKPHOS 84   AST 21   ALT 34       TSH (uIU/mL)   Date Value   11/09/2023 1.78   11/02/2022 0.45   11/22/2021 0.71   12/18/2020 0.18 (L)       No results found for: \"UAMP\", \"UBARB\", \"BENZODIAZEUR\", \"UCANN\", \"UCOC\", \"OPIT\", \"UPCP\"    No results found for: \"IRONSAT\", \"LC13077\", \"ALEIDA\"    No results found for: \"PH\", \"PHARTERIAL\", \"PO2\", \"MT7USYHPTGT\", \"SAT\", \"PCO2\", \"HCO3\", \"BASEEXCESS\", \"HAYLIE\", \"BEB\"    @LABRCNTIPR(phv:4,pco2v:4,po2v:4,hco3v:4,pretty:4,o2per:4)@    Echocardiology: No results found for this or any previous visit (from the past 4320 hour(s)).    Chest x-ray: No results found for this or any previous visit from the past 365 days.      Chest CT: No results found for this or any previous visit from the past 365 days.      PFT: Most Recent [place] Pulmonary Function Testing  No results found    Patient was strongly advised to avoid driving, operating any heavy " machinery or other hazardous situations while drowsy or sleepy.  Patient was counseled on the importance of driving while alert, to pull over if drowsy, or nap before getting into the vehicle if sleepy.      Plan is for Du Mayo to follow up in about 3 month(s).     The above note was dictated using voice recognition software. Although reviewed after completion, some word and grammatical error may remain . Please contact the author for any clarifications.    Total time spent reviewing medical records, history and physical examination, review of previous testing and interpretation as well as documentation on this date, 11/20/23: 47 minutes    Gus Quan MD on 10/20/2022   [facility]   Floor 1, [address]   [address] [place] [address]   Appointments: [phone] [facility]   Floor 1, [address]   [address] [place] [address]   Appointments: [phone]     CC: Alphonse Moreno

## 2023-12-12 ENCOUNTER — TELEPHONE (OUTPATIENT)
Dept: FAMILY MEDICINE | Facility: CLINIC | Age: 68
End: 2023-12-12
Payer: COMMERCIAL

## 2023-12-12 NOTE — TELEPHONE ENCOUNTER
General Call    Contacts         Type Contact Phone/Fax    12/12/2023 10:56 AM CST Phone (Incoming) Neo Mayo (Self) [phone] (M)          Reason for Call:  Patient called regarding CPAP   Patient has an Authorization number  [id number] for a CPAP machine/equipment.  Patient said he would like to get the CPAP equipment from [facility], but when he went there they told patient they cannot provide it because another CPAP store is holding the authorization.  Patient is trying to figure who is holding the authorization for the CPAP.  No information found in the chart.   Patient spoke to insurance and they sent patient back to [facility].      If there is any information that is found that could assist patient.     Please notify patient and Advise.     Date of last appointment with provider:     Could we send this information to you in [facility] or would you prefer to receive a phone call?:   Patient would prefer a phone call   Okay to leave a detailed message?: Yes at Cell number on file:    Telephone Information:   Mobile [phone]

## 2023-12-12 NOTE — TELEPHONE ENCOUNTER
Left message for patient that per 5/23/23 Telephone encounter orders were faxed to [facility] and he may need to call them to have orders released so he can go to [facility].   La Peguero LPN

## 2023-12-15 ENCOUNTER — DOCUMENTATION ONLY (OUTPATIENT)
Dept: SLEEP MEDICINE | Facility: CLINIC | Age: 68
End: 2023-12-15
Payer: COMMERCIAL

## 2023-12-15 DIAGNOSIS — G47.33 OBSTRUCTIVE SLEEP APNEA (ADULT) (PEDIATRIC): Primary | ICD-10-CM

## 2023-12-15 NOTE — PROGRESS NOTES
Patient was offered choice of vendor and chose Pending sale to [facility].  Patient Du Mayo was set up at [facility]  on December 15, 2023. Patient received a Resmed Airsense 10 Pressures were set at  8 cm H2O.   Patient s ramp is 5 cm H2O for Auto and FLEX/EPR is EPR, 1.  Patient received a Resmed Mask name: Airfit P10  Pillow mask size Large, heated tubing and heated humidifier.  Patient has the following compliance requirements: using and visit requirements  Patient has a follow up on TBD with Dr Mustapha Rivera

## 2024-01-21 DIAGNOSIS — E03.9 HYPOTHYROIDISM, UNSPECIFIED TYPE: ICD-10-CM

## 2024-01-22 RX ORDER — LEVOTHYROXINE SODIUM 150 UG/1
150 TABLET ORAL DAILY
Qty: 90 TABLET | Refills: 1 | Status: SHIPPED | OUTPATIENT
Start: 2024-01-22 | End: 2024-07-22

## 2024-02-12 DIAGNOSIS — K20.0 EOSINOPHILIC ESOPHAGITIS: ICD-10-CM

## 2024-02-12 DIAGNOSIS — K21.00 GASTROESOPHAGEAL REFLUX DISEASE WITH ESOPHAGITIS, UNSPECIFIED WHETHER HEMORRHAGE: ICD-10-CM

## 2024-02-12 RX ORDER — LANSOPRAZOLE 30 MG/1
CAPSULE, DELAYED RELEASE ORAL
Qty: 180 CAPSULE | Refills: 3 | OUTPATIENT
Start: 2024-02-12

## 2024-02-15 DIAGNOSIS — K21.00 GASTROESOPHAGEAL REFLUX DISEASE WITH ESOPHAGITIS, UNSPECIFIED WHETHER HEMORRHAGE: ICD-10-CM

## 2024-02-15 DIAGNOSIS — K20.0 EOSINOPHILIC ESOPHAGITIS: ICD-10-CM

## 2024-02-15 NOTE — TELEPHONE ENCOUNTER
Medication Question or Refill    What medication are you calling about (include dose and sig)?: Lansoprazole 30 MG DR capsule    Preferred Pharmacy:  [facility] - [address] [address]  Phone: [phone] Fax: [phone]    Controlled Substance Agreement on file:   CSA -- Patient Level:    CSA: None found at the patient level.       Who prescribed the medication?: Dr. Moreno    Do you need a refill? Yes    Patient offered an appointment? No    Do you have any questions or concerns?  No      Could we send this information to you in [facility] or would you prefer to receive a phone call?:   Patient would prefer a phone call   Okay to leave a detailed message?: Yes at Cell number on file:    Telephone Information:   Mobile [phone]

## 2024-02-16 RX ORDER — LANSOPRAZOLE 30 MG/1
CAPSULE, DELAYED RELEASE ORAL
Qty: 180 CAPSULE | Refills: 3 | OUTPATIENT
Start: 2024-02-16

## 2024-02-21 ENCOUNTER — PATIENT OUTREACH (OUTPATIENT)
Dept: GASTROENTEROLOGY | Facility: CLINIC | Age: 69
End: 2024-02-21
Payer: COMMERCIAL

## 2024-03-04 DIAGNOSIS — K21.00 GASTROESOPHAGEAL REFLUX DISEASE WITH ESOPHAGITIS, UNSPECIFIED WHETHER HEMORRHAGE: ICD-10-CM

## 2024-03-04 DIAGNOSIS — K20.0 EOSINOPHILIC ESOPHAGITIS: ICD-10-CM

## 2024-03-04 RX ORDER — LANSOPRAZOLE 30 MG/1
CAPSULE, DELAYED RELEASE ORAL
Qty: 180 CAPSULE | Refills: 1 | Status: SHIPPED | OUTPATIENT
Start: 2024-03-04 | End: 2024-08-26

## 2024-03-04 NOTE — TELEPHONE ENCOUNTER
03/04/2024    Pt called and stated he requested a refill on his Lansoprazole 30 Mg DR Capsules on 02/12/2024 & 02/15/2024 and PCP has not sent it to the pharmacy.     LANsoprazole (PREVACID) 30 MG DR capsule 180 capsule 3 11/9/2023 -- No   Sig: [LANSOPRAZOLE (PREVACID) 30 MG CAPSULE] Take one pill twice daily     Pt states he would like this Rx expedited and sent to [facility] pharmacy today.    Beth Comer

## 2024-05-21 ASSESSMENT — SLEEP AND FATIGUE QUESTIONNAIRES
HOW LIKELY ARE YOU TO NOD OFF OR FALL ASLEEP WHILE SITTING QUIETLY AFTER LUNCH WITHOUT ALCOHOL: WOULD NEVER DOZE
HOW LIKELY ARE YOU TO NOD OFF OR FALL ASLEEP WHILE SITTING INACTIVE IN A PUBLIC PLACE: SLIGHT CHANCE OF DOZING
HOW LIKELY ARE YOU TO NOD OFF OR FALL ASLEEP WHILE WATCHING TV: SLIGHT CHANCE OF DOZING
HOW LIKELY ARE YOU TO NOD OFF OR FALL ASLEEP WHEN YOU ARE A PASSENGER IN A CAR FOR AN HOUR WITHOUT A BREAK: WOULD NEVER DOZE
HOW LIKELY ARE YOU TO NOD OFF OR FALL ASLEEP WHILE LYING DOWN TO REST IN THE AFTERNOON WHEN CIRCUMSTANCES PERMIT: MODERATE CHANCE OF DOZING
HOW LIKELY ARE YOU TO NOD OFF OR FALL ASLEEP WHILE SITTING AND TALKING TO SOMEONE: WOULD NEVER DOZE
HOW LIKELY ARE YOU TO NOD OFF OR FALL ASLEEP IN A CAR, WHILE STOPPED FOR A FEW MINUTES IN TRAFFIC: WOULD NEVER DOZE
HOW LIKELY ARE YOU TO NOD OFF OR FALL ASLEEP WHILE SITTING AND READING: WOULD NEVER DOZE

## 2024-05-24 ENCOUNTER — OFFICE VISIT (OUTPATIENT)
Dept: SLEEP MEDICINE | Facility: CLINIC | Age: 69
End: 2024-05-24
Payer: COMMERCIAL

## 2024-05-24 VITALS
BODY MASS INDEX: 33.23 KG/M2 | RESPIRATION RATE: 12 BRPM | OXYGEN SATURATION: 95 % | HEIGHT: 71 IN | SYSTOLIC BLOOD PRESSURE: 125 MMHG | WEIGHT: 237.38 LBS | DIASTOLIC BLOOD PRESSURE: 75 MMHG | HEART RATE: 65 BPM

## 2024-05-24 DIAGNOSIS — G47.33 OSA ON CPAP: Primary | ICD-10-CM

## 2024-05-24 PROCEDURE — 99212 OFFICE O/P EST SF 10 MIN: CPT | Performed by: STUDENT IN AN ORGANIZED HEALTH CARE EDUCATION/TRAINING PROGRAM

## 2024-05-24 RX ORDER — LORATADINE 10 MG/1
10 TABLET ORAL DAILY
COMMUNITY
Start: 2016-05-21

## 2024-05-24 NOTE — PROGRESS NOTES
[facility]     Sleep clinic follow up visit note    Date on this visit: 5/24/2024    Primary Physician: Alphonse Moreno     History of present illness:  Du Mayo is a 69 year old male patient with Hypothyroidism and GERD who presents for CPAP Follow Up  He has moderate sleep apnea with an AHI of 20.6, managed with CPAP.     Do you use a CPAP Machine at home: Yes  DME: [facility]ring from [facility] to [facility]  Overall, on a scale of 0-10 how would you rate your CPAP (0 poor, 10 great): 8    What type of mask do you use: Nasal Pillow  Is your mask comfortable: Yes  If not, why:      Is your mask leaking: No  If yes, where do you feel it:    How many night per week does the mask leak (0-7):      Do you notice snoring with mask on: No  Do you notice gasping arousals with mask on: No  Are you having significant oral or nasal dryness: No  Is the pressure setting comfortable: Yes  If not, why:      What is your typical bedtime: Varies  How long does it take you to go to sleep on PAP therapy: 0-5 minutes  What time do you typically get out of bed for the day: Varies  How many hours on average per night are you using PAP therapy: 6-8  How many hours are you sleeping per night: 6-8  Do you feel well rested in the morning: Yes    ResMed   CPAP 8.0 cmH2O 30 day usage data:  70% of days with > 4 hours of use. 9/30 days with no use.   Average use 271 minutes per day.   95%ile Leak 6.33 L/min.   AHI 0.6 events per hour.       Assessment and Plan:  Problem List Items Addressed This Visit       JORGE on CPAP - Primary     Du Mayo has Moderate Sleep apnea. He is tolerating PAP well and reports adequate compliance with PAP therapy. Daytime symptoms are improved and reports positive benefits with PAP use.   JORGE is adequately controlled with Auto CPAP at the current settings per compliance DL.   Prescription provided for renewal of PAP supplies.  Recommended him/her to continue using the CPAP regularly during  sleep and instructed  to get  the supplies for the PAP replaced regularly.    Patient instructed to remember to bring PAP with him/her if hospitalized and if anticipating procedure that requires sedation/surgery to be sure to discuss with the provider/surgeon that he/she has sleep apnea and uses PAP therapy.           Relevant Orders    Comprehensive DME (Completed)       SCALES:  EPWORTH SLEEPINESS SCALE       5/21/2024     7:15 PM    [place] Sleepiness Scale ( DELANEY Moser  [id number]<br>ESS - [place]/English - Final version - 21 Nov 07 - [facility] [place].)   Sitting and reading Would never doze   Watching TV Slight chance of dozing   Sitting, inactive in a public place (e.g. a theatre or a meeting) Slight chance of dozing   As a passenger in a car for an hour without a break Would never doze   Lying down to rest in the afternoon when circumstances permit Moderate chance of dozing   Sitting and talking to someone Would never doze   Sitting quietly after a lunch without alcohol Would never doze   In a car, while stopped for a few minutes in traffic Would never doze   [place] Score (MC) 4   [place] Score (Sleep) 4       INSOMNIA SEVERITY INDEX (GONZALO)        5/21/2024     7:10 PM   Insomnia Severity Index (GONZALO)   Difficulty falling asleep 0   Difficulty staying asleep 1   Problems waking up too early 1   How SATISFIED/DISSATISFIED are you with your CURRENT sleep pattern? 1   How NOTICEABLE to others do you think your sleep problem is in terms of impairing the quality of your life? 0   How WORRIED/DISTRESSED are you about your current sleep problem? 0   To what extent do you consider your sleep problem to INTERFERE with your daily functioning (e.g. daytime fatigue, mood, ability to function at work/daily chores, concentration, memory, mood, etc.) CURRENTLY? 0   GONZALO Total Score 3     Guidelines for Scoring/Interpretation:  Total score categories:  0-7 = No clinically significant insomnia   8-14 = Subthreshold insomnia    15-21 = Clinical insomnia (moderate severity)  22-28 = Clinical insomnia (severe)  Used via courtesy of www.myhealth.va.gov with permission from Lui White PhD., [facility]      Allergies:    Allergies   Allergen Reactions    Cats     Seasonal Allergies        Medications:    Current Outpatient Medications   Medication Sig Dispense Refill    LANsoprazole (PREVACID) 30 MG DR capsule [LANSOPRAZOLE (PREVACID) 30 MG CAPSULE] Take one pill twice daily 180 capsule 1    levothyroxine (SYNTHROID/LEVOTHROID) 150 MCG tablet TAKE 1 TABLET DAILY 90 tablet 1    loratadine (CLARITIN) 10 MG tablet Take 10 mg by mouth daily      desonide (DESOWEN) 0.05 % external ointment  (Patient not taking: Reported on 11/20/2023)         Problem List:  Patient Active Problem List    Diagnosis Date Noted    Eosinophilic esophagitis 01/15/2019     Priority: Medium     Diagnosed January 2019 via EGD        JORGE on CPAP      Priority: Medium     Dx around 2003  on CPAP  was snoring        Essential tremor      Priority: Medium      Gradually   worse-dad had  hands mainly-some fine dexterity        Rhinitis      Priority: Medium     Probably a combination of allergic and nonallergic causes.    hayfever-seasonal-year round symptoms  zyrtec and Claritin both daily-most of year        Benign Adenomatous Polyp Of The Large Intestine      Priority: Medium    Male Erectile Disorder      Priority: Medium     Cialis prn  Testosterone level-2011-        Hypothyroidism      Priority: Medium     On thyroid replacement      Basal Cell Carcinoma Of The Skin      Priority: Medium     Jaw, neck-four different areas  Moh's  Dermatology q six months  Triamcinolone for dry skin after biopsies        Obesity      Priority: Medium     BMI over 30          Chronic Reflux Esophagitis      Priority: Medium     Prevacid daily-recommend lifelong  [id number]-Shatki's ring            Past Medical/Surgical History:  No past medical history on file.  Past  Surgical History:   Procedure Laterality Date    APPENDECTOMY      DILATE ESOPHAGUS      for a Schatze's Ring-on prevacid-trouble swallowing, several times    HERNIA REPAIR, UMBILICAL  11/2017    MOHS MICROGRAPHIC PROCEDURE      TONSILLECTOMY & ADENOIDECTOMY         Social History:  Social History     Socioeconomic History    Marital status:      Spouse name: Not on file    Number of children: 1    Years of education: Not on file    Highest education level: Not on file   Occupational History    Occupation: [employer]     Comment: Retired   Tobacco Use    Smoking status: Never    Smokeless tobacco: Never   Vaping Use    Vaping status: Never Used   Substance and Sexual Activity    Alcohol use: Yes     Comment: Alcoholic Drinks/day: very little    Drug use: No    Sexual activity: Yes     Partners: Female   Other Topics Concern    Not on file   Social History Narrative    Diet- Regular, reduce portion sizes.            Exercise- Basketball 1x per week, Fitness center/cardio 2-4x per week, golf, biking, hiking, skiing,      Social Determinants of Health     Financial Resource Strain: Low Risk  (11/9/2023)    Financial Resource Strain     Within the past 12 months, have you or your family members you live with been unable to get utilities (heat, electricity) when it was really needed?: No   Food Insecurity: Low Risk  (11/9/2023)    Food Insecurity     Within the past 12 months, did you worry that your food would run out before you got money to buy more?: No     Within the past 12 months, did the food you bought just not last and you didn t have money to get more?: No   Transportation Needs: Low Risk  (11/9/2023)    Transportation Needs     Within the past 12 months, has lack of transportation kept you from medical appointments, getting your medicines, non-medical meetings or appointments, work, or from getting things that you need?: No   Physical Activity: Not on file   Stress: Not on file   Social  "Connections: Not on file   Interpersonal Safety: Low Risk  (11/9/2023)    Interpersonal Safety     Do you feel physically and emotionally safe where you currently live?: Yes     Within the past 12 months, have you been hit, slapped, kicked or otherwise physically hurt by someone?: No     Within the past 12 months, have you been humiliated or emotionally abused in other ways by your partner or ex-partner?: No   Housing Stability: Low Risk  (11/9/2023)    Housing Stability     Do you have housing? : Yes     Are you worried about losing your housing?: No       Family History:  Family History   Problem Relation Age of Onset    Hypothyroidism Mother     Osteoporosis Mother     Prostate Cancer Father         Dx 50s    Skin Cancer Father         sister    Pacemaker Father     Tremor Father         Benign essential tremor    Coronary Artery Disease Father 87    Sleep Apnea Sister     Asthma Sister     Scoliosis Sister     Scoliosis Sister     Prostate Cancer Brother         Dx 60s    Asthma Brother     Tremor Brother        Review of systems  A complete review of systems reviewed by me is negative with the exeption of what has been mentioned in the history of present illness.    Physical Examination:  Vitals: /75   Pulse 65   Resp 12   Ht 1.803 m (5' 11\")   Wt 107.7 kg (237 lb 6 oz)   SpO2 95%   BMI 33.11 kg/m    BMI= Body mass index is 33.11 kg/m .     GENERAL: alert and no distress  EYES: Eyes grossly normal to inspection.  No discharge or erythema, or obvious scleral/conjunctival abnormalities.  RESP: No audible wheeze, cough, or visible cyanosis.    SKIN: Visible skin clear. No significant rash, abnormal pigmentation or lesions.  NEURO: Cranial nerves grossly intact.  Mentation and speech appropriate for age.  PSYCH: Appropriate affect, tone, and pace of words          Other tests/labs:   I have reviewed the labs and personally reviewed the imaging below and made my comment in the assessment and " plan.        Patient was strongly advised to avoid driving, operating any heavy machinery or other hazardous situations while drowsy or sleepy.  Patient was counseled on the importance of driving while alert, to pull over if drowsy, or nap before getting into the vehicle if sleepy.      Plan is for Du Mayo to follow up in about 12 month(s).     The above note was dictated using voice recognition software. Although reviewed after completion, some word and grammatical error may remain . Please contact the author for any clarifications.    Total time spent reviewing medical records, history and physical examination, review of previous testing and interpretation as well as documentation on this date, 05/24/24: 10 minutes    Gus Quan MD on 10/20/2022   [facility]   Floor [address], [address]   [address] [place], [place] [address]. [place] [address]   Appointments: [phone]     CC: No ref. provider found

## 2024-05-24 NOTE — ASSESSMENT & PLAN NOTE
Du Mayo has Moderate Sleep apnea. He is tolerating PAP well and reports adequate compliance with PAP therapy. Daytime symptoms are improved and reports positive benefits with PAP use.   JORGE is adequately controlled with Auto CPAP at the current settings per compliance DL.   Prescription provided for renewal of PAP supplies.  Recommended him/her to continue using the CPAP regularly during sleep and instructed  to get  the supplies for the PAP replaced regularly.    Patient instructed to remember to bring PAP with him/her if hospitalized and if anticipating procedure that requires sedation/surgery to be sure to discuss with the provider/surgeon that he/she has sleep apnea and uses PAP therapy.

## 2024-05-24 NOTE — NURSING NOTE
"    Chief Complaint   Patient presents with    CPAP Follow Up       Initial /75   Pulse 65   Resp 12   Ht 1.803 m (5' 11\")   Wt 107.7 kg (237 lb 6 oz)   SpO2 95%   BMI 33.11 kg/m   Estimated body mass index is 33.11 kg/m  as calculated from the following:    Height as of this encounter: 1.803 m (5' 11\").    Weight as of this encounter: 107.7 kg (237 lb 6 oz).    Medication Reconciliation: complete    Neck circumference:  inches /  centimeters.    DME: [facility] but transferring to [facility]    [facility]ann[facility] Bajwa [facility]       "

## 2024-05-24 NOTE — PATIENT INSTRUCTIONS
MY INFORMATION ON SLEEP APNEA-  Du Mayo    DOCTOR : Gus Quan MD  SLEEP CENTER :      MY CONTACT NUMBER:    [facility]  [phone]  [facility]      For general sleep health questions:   http://sleepeducation.org    For tips about PAP and COVID-19:  https://www.[email or web address].org/patients/patient-resources/resources/covid-19-and-home-pap-therapy.pdf    For general info about COVID-19 including vaccines:  [email or web address]      Continue PAP therapy every night, for all hours that you are sleeping (including naps.)  As always, try to get at least 8 hours of sleep or more each day, keep a regular sleep schedule, and avoid sleep deprivation. Avoid alcohol.  Reasons that you might need a change to your pressure therapy would be weight gain or loss, waking having inadvertently removed your PAP overnight, having previously felt refreshed by sleep with CPAP use and now waking un-refreshed, and return of daytime sleepiness. Also, the development of new medical problems  (such as heart failure, stroke, medications such as narcotics) can sometimes affect breathing at night and change your PAP therapy needs.  Please bring PAP with you if you are hospitalized.  If anticipating surgery be sure to discuss with your surgeon that you have sleep apnea and use PAP therapy.    Maintain your equipment as recommended which includes routine cleaning and replacement of supplies.      Call DME for any questions regarding supplies or maintenance.    [facility], [facility] [phone]    Do not drive on engage in potentially dangerous activities if feeling sleepy.  Please follow up in sleep clinic again in 12 months.        Tips for your PAP use-    Mask fitting tips  Mask fitting exercise:    To improve your mask seal and your mobility at night, put mask on and secure in place.  Lie down in bed with full pressure  and roll to one side, adjust headgear while in that position to eliminate any leaks. Repeat process rolling to other side.     The mask seal does not have to be perfect:   CPAP machines are designed to make up for small leaks. However, you will not tolerate leaks blowing in your eyes so you will need to adjust.   Any leak should only be near or at the bottom of the mask.  We expect your mask to leak slightly at night.    Do not over-tighten the headgear straps, tighter IS NOT better, we expect minimal leak.    First try re-positioning the mask or headgear before tightening the headgear straps.  Mask leaks are expected due to changing sleeping positions. Try pulling the mask away from your skin allowing the cushion to re-inflate will minimize the leak.  If you struggle for a good fit, try turning the CPAP off and then readjust the mask by pulling it away from your face and then turning back on the CPAP.        Humidifier tips  Humidifiers can be adjusted to increase or decrease the amount of moisture according to your comfort level. You may need to adjust this frequently at first, but then might only change it with seasonal weather changes.     Try INCREASING the humidity if:  You experience a dry, irritated nasal passage or throat.  You have a runny, drippy nose or sneezing fits after using CPAP.  You experience nasal congestion during or after CPAP use.    Try DECREASING the humidity if:  You have excessive condensation or  rain out  in the tubing or mask.  Otherwise keep the tubing warm during the night by running it underneath the blankets or pillow.      Clinic visit after initial PAP set-up   Bring your equipment with you to your 5-8 week follow up clinic visit.  We will be extracting your data from the machine if not available from the cloud based [employer].        Travel  Always take your equipment with you when you travel.  If you fly with your equipment bring it on with you as a carry on.  Medical equipment  does not count as a carry on.    If you travel international the machines take 110-240v.  The only adapter needed is the adapter that will fit into the receptacle (outlet).    You may also want to bring an extension cord as many hotel rooms have limited outlets at the bedside.  Do not travel with water in your humidifier chamber.     Cleaning and Maintenance Guidelines    Equipment Frequency Cleaning Method   Mask First Day    Daily      Weekly Soak mask in hot soapy water for 30 minutes, rinse and air dry.  Wipe nasal cushion with a hot soapy (Ivory, baby shampoo) cloth and rinse.  Baby wipes may also be used.  Do not use anti-bacterial soaps,Myrtle  liquid soap, rubbing alcohol, bleach or ammonia.  Wash frame in hot soapy water (Ivory, baby shampoo) rinse and let air dry   Headgear Biweekly Wash in hot soapy water, rinse and air dry   Reusable Gray Filter Weekly Wash in hot soapy water, rinse, put in towel squeeze moisture out, let air dry   Disposable White Filter Check Weekly Replace when brown or gray in color; at least every 2 to 3 months   Humidifier Chamber Daily    Weekly Empty distilled water from humidifier and let air dry    Hand wash in hot soapy water, rinse and air dry   Tubing Weekly Wash in hot soapy water, rinse and let air dry   Mask, Tubing and Humidifier Chamber As needed Disinfect: Soak in 1 part distilled white vinegar to 3 parts hot water for 30 minutes, rinse well and air dry  Not the material headgear        MASK AND SUPPLY REORDERING and EQUIPMENT NEEDS through your DME and per your insurance  Reminder: Most insurance companies will allow for a new mask, headgear, tubing, and reusable gray filter every six months.  Disposable white ultra-fine filters are covered monthly.      HOME AND SAFETY INSTRUCTIONS  Do not use frayed or cracked electrical cords, multi plug adaptors, or switched receptacles  Do not immerse electrical equipment into water  Assure that electrical cords do not become a  tripping hazard

## 2024-07-22 DIAGNOSIS — E03.9 HYPOTHYROIDISM, UNSPECIFIED TYPE: ICD-10-CM

## 2024-07-22 RX ORDER — LEVOTHYROXINE SODIUM 150 UG/1
150 TABLET ORAL DAILY
Qty: 90 TABLET | Refills: 0 | Status: SHIPPED | OUTPATIENT
Start: 2024-07-22

## 2024-07-30 ENCOUNTER — TRANSFERRED RECORDS (OUTPATIENT)
Dept: HEALTH INFORMATION MANAGEMENT | Facility: CLINIC | Age: 69
End: 2024-07-30
Payer: COMMERCIAL

## 2024-08-25 DIAGNOSIS — K20.0 EOSINOPHILIC ESOPHAGITIS: ICD-10-CM

## 2024-08-25 DIAGNOSIS — K21.00 GASTROESOPHAGEAL REFLUX DISEASE WITH ESOPHAGITIS, UNSPECIFIED WHETHER HEMORRHAGE: ICD-10-CM

## 2024-08-26 RX ORDER — LANSOPRAZOLE 30 MG/1
CAPSULE, DELAYED RELEASE ORAL
Qty: 180 CAPSULE | Refills: 0 | Status: SHIPPED | OUTPATIENT
Start: 2024-08-26

## 2024-08-28 ENCOUNTER — TRANSFERRED RECORDS (OUTPATIENT)
Dept: HEALTH INFORMATION MANAGEMENT | Facility: CLINIC | Age: 69
End: 2024-08-28
Payer: COMMERCIAL

## 2024-09-04 ENCOUNTER — TRANSFERRED RECORDS (OUTPATIENT)
Dept: HEALTH INFORMATION MANAGEMENT | Facility: CLINIC | Age: 69
End: 2024-09-04
Payer: COMMERCIAL

## 2024-10-20 DIAGNOSIS — E03.9 HYPOTHYROIDISM, UNSPECIFIED TYPE: ICD-10-CM

## 2024-10-21 RX ORDER — LEVOTHYROXINE SODIUM 150 UG/1
150 TABLET ORAL DAILY
Qty: 90 TABLET | Refills: 0 | Status: SHIPPED | OUTPATIENT
Start: 2024-10-21 | End: 2024-11-12

## 2024-11-11 SDOH — HEALTH STABILITY: PHYSICAL HEALTH: ON AVERAGE, HOW MANY MINUTES DO YOU ENGAGE IN EXERCISE AT THIS LEVEL?: 50 MIN

## 2024-11-11 SDOH — HEALTH STABILITY: PHYSICAL HEALTH: ON AVERAGE, HOW MANY DAYS PER WEEK DO YOU ENGAGE IN MODERATE TO STRENUOUS EXERCISE (LIKE A BRISK WALK)?: 4 DAYS

## 2024-11-11 ASSESSMENT — SOCIAL DETERMINANTS OF HEALTH (SDOH): HOW OFTEN DO YOU GET TOGETHER WITH FRIENDS OR RELATIVES?: MORE THAN THREE TIMES A WEEK

## 2024-11-12 ENCOUNTER — OFFICE VISIT (OUTPATIENT)
Dept: FAMILY MEDICINE | Facility: CLINIC | Age: 69
End: 2024-11-12
Payer: COMMERCIAL

## 2024-11-12 VITALS
RESPIRATION RATE: 18 BRPM | DIASTOLIC BLOOD PRESSURE: 80 MMHG | OXYGEN SATURATION: 96 % | SYSTOLIC BLOOD PRESSURE: 139 MMHG | BODY MASS INDEX: 33.01 KG/M2 | HEART RATE: 55 BPM | WEIGHT: 235.8 LBS | HEIGHT: 71 IN | TEMPERATURE: 98.2 F

## 2024-11-12 DIAGNOSIS — Z00.00 WELLNESS EXAMINATION: ICD-10-CM

## 2024-11-12 DIAGNOSIS — K20.0 EOSINOPHILIC ESOPHAGITIS: ICD-10-CM

## 2024-11-12 DIAGNOSIS — E06.3 HYPOTHYROIDISM DUE TO HASHIMOTO THYROIDITIS: Primary | ICD-10-CM

## 2024-11-12 DIAGNOSIS — Z23 NEED FOR VACCINATION: ICD-10-CM

## 2024-11-12 DIAGNOSIS — K21.00 GASTROESOPHAGEAL REFLUX DISEASE WITH ESOPHAGITIS, UNSPECIFIED WHETHER HEMORRHAGE: ICD-10-CM

## 2024-11-12 DIAGNOSIS — Z13.220 SCREENING FOR LIPID DISORDERS: ICD-10-CM

## 2024-11-12 DIAGNOSIS — Z12.5 SCREENING FOR PROSTATE CANCER: ICD-10-CM

## 2024-11-12 DIAGNOSIS — H91.93 BILATERAL HEARING LOSS, UNSPECIFIED HEARING LOSS TYPE: ICD-10-CM

## 2024-11-12 LAB
ALBUMIN SERPL BCG-MCNC: 4.2 G/DL (ref 3.5–5.2)
ALP SERPL-CCNC: 107 U/L (ref 40–150)
ALT SERPL W P-5'-P-CCNC: 27 U/L (ref 0–70)
ANION GAP SERPL CALCULATED.3IONS-SCNC: 9 MMOL/L (ref 7–15)
AST SERPL W P-5'-P-CCNC: 23 U/L (ref 0–45)
BILIRUB SERPL-MCNC: 0.7 MG/DL
BUN SERPL-MCNC: 14.4 MG/DL (ref 8–23)
CALCIUM SERPL-MCNC: 9.2 MG/DL (ref 8.8–10.4)
CHLORIDE SERPL-SCNC: 104 MMOL/L (ref 98–107)
CHOLEST SERPL-MCNC: 162 MG/DL
CREAT SERPL-MCNC: 1.14 MG/DL (ref 0.67–1.17)
EGFRCR SERPLBLD CKD-EPI 2021: 70 ML/MIN/1.73M2
FASTING STATUS PATIENT QL REPORTED: ABNORMAL
FASTING STATUS PATIENT QL REPORTED: NORMAL
GLUCOSE SERPL-MCNC: 92 MG/DL (ref 70–99)
HCO3 SERPL-SCNC: 28 MMOL/L (ref 22–29)
HDLC SERPL-MCNC: 37 MG/DL
LDLC SERPL CALC-MCNC: 106 MG/DL
NONHDLC SERPL-MCNC: 125 MG/DL
POTASSIUM SERPL-SCNC: 4.2 MMOL/L (ref 3.4–5.3)
PROT SERPL-MCNC: 7.4 G/DL (ref 6.4–8.3)
PSA SERPL DL<=0.01 NG/ML-MCNC: 4.24 NG/ML (ref 0–4.5)
SODIUM SERPL-SCNC: 141 MMOL/L (ref 135–145)
TRIGL SERPL-MCNC: 96 MG/DL
TSH SERPL DL<=0.005 MIU/L-ACNC: 0.4 UIU/ML (ref 0.3–4.2)

## 2024-11-12 RX ORDER — LEVOTHYROXINE SODIUM 150 UG/1
150 TABLET ORAL DAILY
Qty: 90 TABLET | Refills: 3 | Status: SHIPPED | OUTPATIENT
Start: 2024-11-12

## 2024-11-12 RX ORDER — LANSOPRAZOLE 30 MG/1
CAPSULE, DELAYED RELEASE ORAL
Qty: 90 CAPSULE | Refills: 3 | Status: SHIPPED | OUTPATIENT
Start: 2024-11-12

## 2024-11-12 NOTE — PROGRESS NOTES
"Preventive Care Visit  [facility] KAMINI Moreno MD, Family Medicine  Nov 12, 2024      Assessment & Plan     (E06.3) Hypothyroidism  (primary encounter diagnosis)  Comment: On thyroid replacement  Plan: levothyroxine (SYNTHROID/LEVOTHROID) 150 MCG         tablet, TSH             (K21.00) Gastroesophageal reflux disease   Comment: Well-controlled on daily Prevacid  Plan: LANsoprazole (PREVACID) 30 MG DR capsule             (K20.0) Eosinophilic esophagitis  Comment: Well-controlled on daily Prevacid  Plan: LANsoprazole (PREVACID) 30 MG DR capsule             (Z00.00) Wellness examination  Comment: Overall the patient has good health habits and is in good health  Plan: PRIMARY CARE FOLLOW-UP SCHEDULING             (H91.93) Bilateral hearing loss  Comment: Patient has noticed bilateral hearing loss  Plan: Adult Audiology  Referral             (Z12.5) Screening for prostate cancer  Comment: Family history of  Plan: PROSTATE SPEC ANTIGEN SCREEN             (Z23) Need for vaccination  Comment:    Plan: INFLUENZA HIGH DOSE, TRIVALENT, PF (FLUZONE),         COVID-19 12+ (PFIZER)             (Z13.220) Screening for lipid disorders  Comment:    Plan: Comprehensive metabolic panel, Lipid panel         reflex to direct LDL Fasting    PLAN:  1.  Influenza and COVID-vaccine  2.  Audiology referral  3.  Laboratory studies as above  4.  Medications renewed without changes  5.  Patient otherwise should be seen yearly                     BMI  Estimated body mass index is 32.89 kg/m  as calculated from the following:    Height as of this encounter: 1.803 m (5' 11\").    Weight as of this encounter: 107 kg (235 lb 12.8 oz).   Weight management plan: Discussed healthy diet and exercise guidelines    Counseling  Appropriate preventive services were addressed with this patient via screening, questionnaire, or discussion as appropriate for fall prevention, nutrition, physical activity, Tobacco-use " cessation, social engagement, weight loss and cognition.  Checklist reviewing preventive services available has been given to the patient.  Reviewed patient's diet, addressing concerns and/or questions.   The patient was provided with written information regarding signs of hearing loss.           Pj Larson is a 69 year old, presenting for the following:  Annual Visit (Pt fasting,Wellness,wants to talk about changes in hearing and what he can do) and Immunization (Flu and covid)        11/12/2024     8:40 AM   Additional Questions   Roomed by Liliana   Accompanied by Self             HPI  Patient comes in for his annual Medicare wellness examination.  Patient has noticed in the past year or so that he is just not hearing as well out of both ears so perhaps a little bit less on the left than on the right, he is having some difficulty following conversations and the like and would like to be evaluated, he would be open to hearing aids if they were recommended.    Patient has a history of hypothyroidism on thyroid replacement    Patient has a history of eosinophilic esophagitis and a history of having a Schatzki ring and esophageal dilation he is on daily Prevacid.    Patient does have some seasonal allergies well-controlled on antihistamines.    Patient has a history of basal cell skin cancer followed regularly by dermatology.    Overall the patient has extremely good health habits and is in good health.                Health Care Directive  Patient does not have a Health Care Directive: Discussed advance care planning with patient; information given to patient to review.      11/11/2024   General Health   How would you rate your overall physical health? Good   Feel stress (tense, anxious, or unable to sleep) Not at all            11/11/2024   Nutrition   Diet: Regular (no restrictions)            11/11/2024   Exercise   Days per week of moderate/strenous exercise 4 days   Average minutes spent exercising at this  level 50 min            11/11/2024   Social Factors   Frequency of gathering with friends or relatives More than three times a week   Worry food won't last until get money to buy more No   Food not last or not have enough money for food? No   Do you have housing? (Housing is defined as stable permanent housing and does not include staying ouside in a car, in a tent, in an abandoned building, in an overnight shelter, or couch-surfing.) Yes   Are you worried about losing your housing? No   Lack of transportation? No   Unable to get utilities (heat,electricity)? No            11/11/2024   Fall Risk   Fallen 2 or more times in the past year? No     No    Trouble with walking or balance? No     No        Patient-reported    Multiple values from one day are sorted in reverse-chronological order          11/11/2024   Activities of Daily Living- Home Safety   Needs help with the following daily activites None of the above   Safety concerns in the home None of the above            11/11/2024   Dental   Dentist two times every year? Yes            11/11/2024   Hearing Screening   Hearing concerns? (!) IT'S HARD TO FOLLOW A CONVERSATION IN A NOISY RESTAURANT OR CROWDED ROOM.    (!) TROUBLE UNDERSTANDING SOFT OR WHISPERED SPEECH.       Multiple values from one day are sorted in reverse-chronological order         11/11/2024   Driving Risk Screening   Patient/family members have concerns about driving No            11/11/2024   General Alertness/Fatigue Screening   Have you been more tired than usual lately? No            11/11/2024   Urinary Incontinence Screening   Bothered by leaking urine in past 6 months No            11/11/2024   TB Screening   Were you born outside of the [place]? No            Today's PHQ-2 Score:       11/11/2024    12:04 PM   PHQ-2 ( 1999 Pf[employer])   Q1: Little interest or pleasure in doing things 0    Q2: Feeling down, depressed or hopeless 0    PHQ-2 Score 0    Q1: Little interest or pleasure in doing  things Not at all   Q2: Feeling down, depressed or hopeless Not at all   PHQ-2 Score 0       Patient-reported           11/11/2024   Substance Use   Alcohol more than 3/day or more than 7/wk Not Applicable   Do you have a current opioid prescription? No   How severe/bad is pain from 1 to 10? 0/10 (No Pain)   Do you use any other substances recreationally? No        Social History     Tobacco Use    Smoking status: Never    Smokeless tobacco: Never   Vaping Use    Vaping status: Never Used   Substance Use Topics    Alcohol use: Yes     Comment: Alcoholic Drinks/day: very little    Drug use: No           11/11/2024   AAA Screening   Family history of Abdominal Aortic Aneurysm (AAA)? No      Last PSA:   Prostate Specific Antigen Screen   Date Value Ref Range Status   11/12/2024 4.24 0.00 - 4.50 ng/mL Final   11/22/2021 3.32 0.00 - 4.50 ug/L Final     ASCVD Risk   The 10-year ASCVD risk score (Chelle GOMEZ, et al., 2019) is: 17.3%    Values used to calculate the score:      Age: 69 years      Sex: Male      Is Non- : No      Diabetic: No      Tobacco smoker: No      Systolic Blood Pressure: 139 mmHg      Is BP treated: No      HDL Cholesterol: 37 mg/dL      Total Cholesterol: 162 mg/dL              Reviewed and updated as needed this visit by Provider   Tobacco  Allergies  Meds  Problems  Med Hx  Surg Hx  Fam Hx  Soc   Hx Sexual Activity          History reviewed. No pertinent past medical history.  Past Surgical History:   Procedure Laterality Date    APPENDECTOMY      DILATE ESOPHAGUS      for a Schatze's Ring-on prevacid-trouble swallowing, several times    HERNIA REPAIR, UMBILICAL  11/2017    MOHS MICROGRAPHIC PROCEDURE      TONSILLECTOMY & ADENOIDECTOMY       Lab work is in process  Labs reviewed in EPIC  Patient Active Problem List   Diagnosis    Benign Adenomatous Polyp Of The Large Intestine    Male Erectile Disorder    JORGE on CPAP    Essential tremor    Rhinitis     Hypothyroidism    Obesity    Chronic Reflux Esophagitis    Basal Cell Carcinoma Of The Skin    Eosinophilic esophagitis     Past Surgical History:   Procedure Laterality Date    APPENDECTOMY      DILATE ESOPHAGUS      for a Schatze's Ring-on prevacid-trouble swallowing, several times    HERNIA REPAIR, UMBILICAL  11/2017    MOHS MICROGRAPHIC PROCEDURE      TONSILLECTOMY & ADENOIDECTOMY         Social History     Tobacco Use    Smoking status: Never    Smokeless tobacco: Never   Substance Use Topics    Alcohol use: Yes     Comment: Alcoholic Drinks/day: very little     Family History   Problem Relation Age of Onset    Hypothyroidism Mother     Osteoporosis Mother     Prostate Cancer Father         Dx 50s    Skin Cancer Father         sister    Pacemaker Father     Tremor Father         Benign essential tremor    Coronary Artery Disease Father 87    Sleep Apnea Sister     Asthma Sister     Scoliosis Sister     Scoliosis Sister     Prostate Cancer Brother         Dx 60s    Asthma Brother     Tremor Brother          Current Outpatient Medications   Medication Sig Dispense Refill    LANsoprazole (PREVACID) 30 MG DR capsule TAKE ONE CAPSULE BY MOUTH TWICE DAILY 90 capsule 3    levothyroxine (SYNTHROID/LEVOTHROID) 150 MCG tablet Take 1 tablet (150 mcg) by mouth daily. 90 tablet 3    loratadine (CLARITIN) 10 MG tablet Take 10 mg by mouth daily       No Known Allergies  Current providers sharing in care for this patient include:  Patient Care Team:  Alphonse Moreno MD as PCP - General (Family Medicine)  Alphonse Moreno MD as Assigned PCP    The following health maintenance items are reviewed in Epic and correct as of today:  Health Maintenance   Topic Date Due    ANNUAL REVIEW OF  ORDERS  Never done    DTAP/TDAP/TD IMMUNIZATION (3 - Td or Tdap) 04/23/2025    COLORECTAL CANCER SCREENING  08/07/2025    MEDICARE ANNUAL WELLNESS VISIT  11/12/2025    TSH W/FREE T4 REFLEX  11/12/2025    FALL RISK ASSESSMENT  11/12/2025     "GLUCOSE  11/12/2027    LIPID  11/12/2029    ADVANCE CARE PLANNING  11/12/2029    RSV VACCINE (1 - 1-dose 75+ series) 04/25/2032    PHQ-2 (once per calendar year)  Completed    INFLUENZA VACCINE  Completed    Pneumococcal Vaccine: 65+ Years  Completed    COVID-19 Vaccine  Completed    HPV IMMUNIZATION  Aged Out    MENINGITIS IMMUNIZATION  Aged Out    RSV MONOCLONAL ANTIBODY  Aged Out    HEPATITIS C SCREENING  Discontinued    ZOSTER IMMUNIZATION  Discontinued         Review of Systems  Constitutional, HEENT, cardiovascular, pulmonary, GI, , musculoskeletal, neuro, skin, endocrine and psych systems are negative, except as otherwise noted.     Objective    Exam  /80 (BP Location: Left arm, Patient Position: Sitting, Cuff Size: Adult Regular)   Pulse 55   Temp 98.2  F (36.8  C) (Oral)   Resp 18   Ht 1.803 m (5' 11\")   Wt 107 kg (235 lb 12.8 oz)   SpO2 96%   BMI 32.89 kg/m     Estimated body mass index is 32.89 kg/m  as calculated from the following:    Height as of this encounter: 1.803 m (5' 11\").    Weight as of this encounter: 107 kg (235 lb 12.8 oz).    Physical Exam  GENERAL: alert and no distress  EYES: Eyes grossly normal to inspection, PERRL and conjunctivae and sclerae normal  HENT: ear canals and TM's normal, nose and mouth without ulcers or lesions  NECK: no adenopathy, no asymmetry, masses, or scars  RESP: lungs clear to auscultation - no rales, rhonchi or wheezes  CV: regular rate and rhythm, normal S1 S2, no S3 or S4, no murmur, click or rub, no peripheral edema  ABDOMEN: soft, nontender, no hepatosplenomegaly, no masses and bowel sounds normal  MS: no gross musculoskeletal defects noted, no edema  SKIN: no suspicious lesions or rashes  NEURO: Normal strength and tone, mentation intact and speech normal  PSYCH: mentation appears normal, affect normal/bright         11/14/2024   Mini Cog   Clock Draw Score 2 Normal   3 Item Recall 3 objects recalled   Mini Cog Total Score 5             "     Signed Electronically by: Alphonse Moreno MD

## 2024-11-12 NOTE — LETTER
November 13, 2024      [place] [facility]  [address] [address]        Dear ,    We are writing to inform you of your test results.  Sugar is good at 92, you do not have diabetes  Liver and kidney tests are normal  The TSH or thyroid test is normal stay on the same dose of thyroid medication  Cholesterol shows just the slightest elevation of LDL and slightly low HDL but overall well-controlled.    See us again in 1 year          Resulted Orders   Comprehensive metabolic panel   Result Value Ref Range    Sodium 141 135 - 145 mmol/L    Potassium 4.2 3.4 - 5.3 mmol/L    Carbon Dioxide (CO2) 28 22 - 29 mmol/L    Anion Gap 9 7 - 15 mmol/L    Urea Nitrogen 14.4 8.0 - 23.0 mg/dL    Creatinine 1.14 0.67 - 1.17 mg/dL    GFR Estimate 70 >60 mL/min/1.73m2      Comment:      eGFR calculated using 2021 CKD-EPI equation.    Calcium 9.2 8.8 - 10.4 mg/dL      Comment:      Reference intervals for this test were updated on 7/16/2024 to reflect our healthy population more accurately. There may be differences in the flagging of prior results with similar values performed with this method. Those prior results can be interpreted in the context of the updated reference intervals.    Chloride 104 98 - 107 mmol/L    Glucose 92 70 - 99 mg/dL    Alkaline Phosphatase 107 40 - 150 U/L    AST 23 0 - 45 U/L    ALT 27 0 - 70 U/L    Protein Total 7.4 6.4 - 8.3 g/dL    Albumin 4.2 3.5 - 5.2 g/dL    Bilirubin Total 0.7 <=1.2 mg/dL    Patient Fasting > 8hrs? Unknown    Lipid panel reflex to direct LDL Fasting   Result Value Ref Range    Cholesterol 162 <200 mg/dL    Triglycerides 96 <150 mg/dL    Direct Measure HDL 37 (L) >=40 mg/dL    LDL Cholesterol Calculated 106 (H) <100 mg/dL    Non HDL Cholesterol 125 <130 mg/dL    Patient Fasting > 8hrs? Unknown     Narrative    Cholesterol  Desirable: < 200 mg/dL  Borderline High: 200 - 239 mg/dL  High: >= 240 mg/dL    Triglycerides  Normal: < 150 mg/dL  Borderline High: 150 - 199  mg/dL  High: 200-499 mg/dL  Very High: >= 500 mg/dL    Direct Measure HDL  Female: >= 50 mg/dL   Male: >= 40 mg/dL    LDL Cholesterol  Desirable: < 100 mg/dL  Above Desirable: 100 - 129 mg/dL   Borderline High: 130 - 159 mg/dL   High:  160 - 189 mg/dL   Very High: >= 190 mg/dL    Non HDL Cholesterol  Desirable: < 130 mg/dL  Above Desirable: 130 - 159 mg/dL  Borderline High: 160 - 189 mg/dL  High: 190 - 219 mg/dL  Very High: >= 220 mg/dL   PROSTATE SPEC ANTIGEN SCREEN   Result Value Ref Range    Prostate Specific Antigen Screen 4.24 0.00 - 4.50 ng/mL    Narrative    This result is obtained using the Roche Elecsys total PSA method on the erin e801 immunoassay analyzer, which is an ultrasensitive method. Results obtained with different assay methods or kits cannot be used interchangeably.  This test is intended for initial prostate cancer screening. PSA values exceeding the age-specific limits are suspicious for prostate disease, but additional testing, such as prostate biopsy, is needed to diagnose prostate pathology. The American Cancer Society recommends annual examination with digital rectal examination and serum PSA beginning at age 50 and for men with a life expectancy of at least 10 years after detection of prostate cancer. For men in high-risk groups, such as  Americans or men with a first-degree relative diagnosed at a younger age, testing should begin at a younger age. It is generally recommended that information be provided to patients about the benefits and limitations of testing and treatment so they can make informed decisions.   TSH   Result Value Ref Range    TSH 0.40 0.30 - 4.20 uIU/mL       If you have any questions or concerns, please call the clinic at the number listed above.       Sincerely,      Alphonse Moreno MD

## 2024-11-13 ENCOUNTER — TRANSFERRED RECORDS (OUTPATIENT)
Dept: HEALTH INFORMATION MANAGEMENT | Facility: CLINIC | Age: 69
End: 2024-11-13
Payer: COMMERCIAL

## 2024-12-15 ENCOUNTER — OFFICE VISIT (OUTPATIENT)
Dept: URGENT CARE | Facility: URGENT CARE | Age: 69
End: 2024-12-15
Payer: COMMERCIAL

## 2024-12-15 VITALS
SYSTOLIC BLOOD PRESSURE: 132 MMHG | OXYGEN SATURATION: 97 % | DIASTOLIC BLOOD PRESSURE: 79 MMHG | HEART RATE: 68 BPM | RESPIRATION RATE: 18 BRPM | TEMPERATURE: 98.1 F

## 2024-12-15 DIAGNOSIS — L08.9 TOE INFECTION: ICD-10-CM

## 2024-12-15 DIAGNOSIS — L03.116 LEFT LEG CELLULITIS: Primary | ICD-10-CM

## 2024-12-15 PROCEDURE — 99213 OFFICE O/P EST LOW 20 MIN: CPT | Performed by: FAMILY MEDICINE

## 2024-12-15 RX ORDER — CEPHALEXIN 500 MG/1
500 CAPSULE ORAL 3 TIMES DAILY
Qty: 21 CAPSULE | Refills: 0 | Status: SHIPPED | OUTPATIENT
Start: 2024-12-15 | End: 2024-12-22

## 2024-12-15 NOTE — PROGRESS NOTES
Assessment:       Left leg cellulitis  - cephALEXin (KEFLEX) 500 MG capsule  Dispense: 21 capsule; Refill: 0    Toe infection  - cephALEXin (KEFLEX) 500 MG capsule  Dispense: 21 capsule; Refill: 0    Plan:     Patient with left leg cellulitis likely starting as a secondary bacterial infection of his left fifth toe.  Will treat with cephalexin.  Keep left lower extremity elevated.  Continue with topical antifungal cream on his toe.  Follow-up if symptoms getting worse or not improving over the next 3 days.      MEDICATIONS:   Orders Placed This Encounter   Medications    cephALEXin (KEFLEX) 500 MG capsule     Sig: Take 1 capsule (500 mg) by mouth 3 times daily for 7 days.     Dispense:  21 capsule     Refill:  0       Subjective:       69 year old male presents for evaluation of progressively worsening redness and pain in his left fifth toe now with redness and pain extending up his left foot in his anterior left calf.  He had some fevers and chills this morning.  He thinks he initially started out with some athlete's foot of that toe but now has developed a bacterial infection.  No shortness of breath or wheezing.  Denies calf pain.    Patient Active Problem List   Diagnosis    Benign Adenomatous Polyp Of The Large Intestine    Male Erectile Disorder    JORGE on CPAP    Essential tremor    Rhinitis    Hypothyroidism    Obesity    Chronic Reflux Esophagitis    Basal Cell Carcinoma Of The Skin    Eosinophilic esophagitis       No past medical history on file.    Past Surgical History:   Procedure Laterality Date    APPENDECTOMY      DILATE ESOPHAGUS      for a Schatze's Ring-on prevacid-trouble swallowing, several times    HERNIA REPAIR, UMBILICAL  11/2017    MOHS MICROGRAPHIC PROCEDURE      TONSILLECTOMY & ADENOIDECTOMY         Current Outpatient Medications   Medication Sig Dispense Refill    cephALEXin (KEFLEX) 500 MG capsule Take 1 capsule (500 mg) by mouth 3 times daily for 7 days. 21 capsule 0    LANsoprazole  (PREVACID) 30 MG DR capsule TAKE ONE CAPSULE BY MOUTH TWICE DAILY 90 capsule 3    levothyroxine (SYNTHROID/LEVOTHROID) 150 MCG tablet Take 1 tablet (150 mcg) by mouth daily. 90 tablet 3    loratadine (CLARITIN) 10 MG tablet Take 10 mg by mouth daily       No current facility-administered medications for this visit.       No Known Allergies    Family History   Problem Relation Age of Onset    Hypothyroidism Mother     Osteoporosis Mother     Prostate Cancer Father         Dx 50s    Skin Cancer Father         sister    Pacemaker Father     Tremor Father         Benign essential tremor    Coronary Artery Disease Father 87    Sleep Apnea Sister     Asthma Sister     Scoliosis Sister     Scoliosis Sister     Prostate Cancer Brother         Dx 60s    Asthma Brother     Tremor Brother        Social History     Socioeconomic History    Marital status:      Spouse name: None    Number of children: 1    Years of education: None    Highest education level: None   Occupational History    Occupation: [employer]     Comment: Retired   Tobacco Use    Smoking status: Never    Smokeless tobacco: Never   Vaping Use    Vaping status: Never Used   Substance and Sexual Activity    Alcohol use: Yes     Comment: Alcoholic Drinks/day: very little    Drug use: No    Sexual activity: Yes     Partners: Female   Social History Narrative    Diet- Regular, reduce portion sizes.            Exercise- Basketball 1x per week, Fitness center/cardio 2-4x per week, golf, biking, hiking, skiing,      Social Drivers of Health     Financial Resource Strain: Low Risk  (11/11/2024)    Financial Resource Strain     Within the past 12 months, have you or your family members you live with been unable to get utilities (heat, electricity) when it was really needed?: No   Food Insecurity: Low Risk  (11/11/2024)    Food Insecurity     Within the past 12 months, did you worry that your food would run out before you got money to buy more?: No      Within the past 12 months, did the food you bought just not last and you didn t have money to get more?: No   Transportation Needs: Low Risk  (11/11/2024)    Transportation Needs     Within the past 12 months, has lack of transportation kept you from medical appointments, getting your medicines, non-medical meetings or appointments, work, or from getting things that you need?: No   Physical Activity: Sufficiently Active (11/11/2024)    Exercise Vital Sign     Days of Exercise per Week: 4 days     Minutes of Exercise per Session: 50 min   Stress: No Stress Concern Present (11/11/2024)    [place] [employer] - Occupational Stress Questionnaire     Feeling of Stress : Not at all   Social Connections: Unknown (11/11/2024)    Social Connection and Isolation Panel [NHANES]     Frequency of Social Gatherings with Friends and Family: More than three times a week   Interpersonal Safety: Low Risk  (11/9/2023)    Interpersonal Safety     Do you feel physically and emotionally safe where you currently live?: Yes     Within the past 12 months, have you been hit, slapped, kicked or otherwise physically hurt by someone?: No     Within the past 12 months, have you been humiliated or emotionally abused in other ways by your partner or ex-partner?: No   Housing Stability: Low Risk  (11/11/2024)    Housing Stability     Do you have housing? : Yes     Are you worried about losing your housing?: No         Review of Systems  Pertinent items are noted in HPI.      Objective:     /79   Pulse 68   Temp 98.1  F (36.7  C) (Oral)   Resp 18   SpO2 97%      General appearance: alert, appears stated age, and cooperative  Extremities: Patient has some maceration noted in the webspace of his left fifth toe with some redness and tenderness of the toe now with erythema over the lateral aspect of the left foot and left calf along with some swelling.  Left anterior calf is tender.  Redness is well-demarcated.        This  note has been dictated using voice recognition software. Any grammatical or context distortions are unintentional and inherent to the software

## 2025-01-29 ENCOUNTER — TRANSFERRED RECORDS (OUTPATIENT)
Dept: HEALTH INFORMATION MANAGEMENT | Facility: CLINIC | Age: 70
End: 2025-01-29
Payer: COMMERCIAL

## 2025-02-24 ENCOUNTER — TELEPHONE (OUTPATIENT)
Dept: FAMILY MEDICINE | Facility: CLINIC | Age: 70
End: 2025-02-24

## 2025-02-24 ENCOUNTER — ANCILLARY PROCEDURE (OUTPATIENT)
Dept: GENERAL RADIOLOGY | Facility: CLINIC | Age: 70
End: 2025-02-24
Attending: NURSE PRACTITIONER
Payer: COMMERCIAL

## 2025-02-24 ENCOUNTER — OFFICE VISIT (OUTPATIENT)
Dept: FAMILY MEDICINE | Facility: CLINIC | Age: 70
End: 2025-02-24
Payer: COMMERCIAL

## 2025-02-24 DIAGNOSIS — M54.12 CERVICAL RADICULOPATHY: ICD-10-CM

## 2025-02-24 DIAGNOSIS — M54.12 CERVICAL RADICULOPATHY: Primary | ICD-10-CM

## 2025-02-24 PROCEDURE — 72040 X-RAY EXAM NECK SPINE 2-3 VW: CPT | Mod: TC | Performed by: RADIOLOGY

## 2025-02-24 PROCEDURE — 99213 OFFICE O/P EST LOW 20 MIN: CPT | Performed by: NURSE PRACTITIONER

## 2025-02-24 RX ORDER — METHYLPREDNISOLONE 4 MG/1
TABLET ORAL
Qty: 21 TABLET | Refills: 0 | Status: SHIPPED | OUTPATIENT
Start: 2025-02-24
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
## 2025-02-24 NOTE — PROGRESS NOTES
Assessment & Plan     Cervical radiculopathy  Due to cervical radiculopathy will do steroid dose pack.    Xray is consistent with spine arthritis as well as so slipped discs. Recommend patient follow up with PT.    Recommend follow up with spine and may need repeat MRI if not improving.    - methylPREDNISolone (MEDROL DOSEPAK) 4 MG tablet therapy pack; Follow Package Directions  - XR Cervical Spine 2/3 Views; Future  - Physical Therapy  Referral; Future                Subjective   Neo is a 69 year old, presenting for the following health issues:  Musculoskeletal Problem (Left arm numbness, pain, weakness, and tingling since Wednesday last week (x6 days).)    History of Present Illness       Reason for visit:  Numbness, tingling, and los of strength in my left arm and hand  Symptom onset:  3-7 days ago  Symptoms include:  Left arm/hanf numbness, tingling, and alison of strength  Symptom intensity:  Moderate  Symptom progression:  Staying the same  Had these symptoms before:  Yes  Has tried/received treatment for these symptoms:  Yes  Previous treatment was successful:  Yes  Prior treatment description:  Physical therapy to open the gap in the C7/T1 vertebrae  What makes it worse:  No  What makes it better:  No   He is taking medications regularly.                     Objective    There were no vitals taken for this visit.  There is no height or weight on file to calculate BMI.  Physical Exam  Constitutional:       Appearance: Normal appearance.   Cardiovascular:      Rate and Rhythm: Normal rate and regular rhythm.   Neurological:      General: No focal deficit present.      Mental Status: He is alert and oriented to person, place, and time.      Motor: Motor function is intact. No weakness.      Comments: Slightly decreased  strength in left hand as compared to right.     Numbness in 4th and 5th fingers of left hand    Psychiatric:         Mood and Affect: Mood normal.         Behavior: Behavior normal.           XR Cervical Spine 2/3 Views    Result Date: 2/25/2025  EXAM: XR CERVICAL SPINE 2/3 VIEWS LOCATION: [facility] DATE: 2/24/2025 INDICATION: Cervical radiculopathy. COMPARISON: None.     IMPRESSION: Minimal degenerative retrolisthesis of C3 upon C4 and C4 upon C5. Alignment is otherwise normal; however, there is straightening of normal cervical lordosis. Vertebral body heights normal. No fractures. There is loss of disc space height and degenerative endplate spurring at C3-C4, C4-C5, C5-C6 and C6-C7. Mild facet arthropathy throughout the cervical spine.          Signed Electronically by: RAFIQ AYALA CNP

## 2025-03-11 ENCOUNTER — THERAPY VISIT (OUTPATIENT)
Dept: PHYSICAL THERAPY | Facility: REHABILITATION | Age: 70
End: 2025-03-11
Attending: NURSE PRACTITIONER
Payer: COMMERCIAL

## 2025-03-11 DIAGNOSIS — M54.12 CERVICAL RADICULOPATHY: ICD-10-CM

## 2025-03-11 NOTE — PROGRESS NOTES
PHYSICAL THERAPY EVALUATION  Type of Visit: Evaluation       Fall Risk Screen:  Fall screen completed by: PT  Have you fallen 2 or more times in the past year?: No  Have you fallen and had an injury in the past year?: No  Is patient a fall risk?: No    Subjective         Presenting condition or subjective complaint: Numbness and tingling in my left hand/arm.    The patient reports to therapy with a chief complaint of -neck pain with radicular symptoms on L side. Was prescribed Medrol dose pack on 2/24/25 and underwent an xray. Advised follow up with spine and potential Mri. On 2/24 visit had been experiencing these symptoms x6 days. Symptoms include L hand numbness, tingling and loss of strength. Has had previous PT with traction to open gap of C7-T1 vertebrae. Doesn't report any improeement in symptoms from the medrol dosepack. Tingling and numbeness on pinky and ring finger, primarily in the fingers. Also notes some strength loss in the arm. Woke up with it one day and also had significant pain in the neck and shoulder. Had two appointments with a chiropractor that seemed to help the neck pain. N/T is constant but notices less tingling in the ring finger but then this does come back quite a bit. Denies change in bowel/bladder, saddle paresthesia, loss of balance or LE strength.     Date of onset: 02/24/25 (MD visit)    Relevant medical history: Overweight; Sleep disorder like apnea; Thyroid problems   Dates & types of surgery:      Prior diagnostic imaging/testing results: X-ray     Xray on 2/24/25:  Minimal degenerative retrolisthesis of C3 upon C4 and C4 upon C5. Alignment is otherwise normal; however, there is straightening of normal cervical lordosis. Vertebral body heights normal. No fractures. There is loss of disc space height and degenerative endplate spurring at C3-C4, C4-C5, C5-C6 and C6-C7. Mild facet arthropathy throughout the cervical spine.      Prior therapy history for the same diagnosis, illness  or injury: Yes 40 years ago, traction to relieve a pinched nerve.    Living Environment  Social support: With a significant other or spouse   Type of home: House; 2-story; Basement   Stairs to enter the home: Yes 2 Is there a railing: No     Ramp: No   Stairs inside the home: Yes 14 Is there a railing: Yes     Help at home: None  Equipment owned:       Employment: No    Hobbies/Interests: Biking, golf, skiing, reading, [employer] activities    Patient goals for therapy: I have lost strength and dexterity in my left arm/hand    Pain assessment: See objective evaluation for additional pain details     Objective     CERVICAL SPINE EVALUATION  PAIN: Pain Level at Rest: 0/10  Pain Level with Use: 5/10  Pain Location: cervical spine, shoulder, elbow, wrist, hand, ring finger, and small finger  POSTURE: Sitting Posture: Rounded shoulders, Forward head, Thoracic kyphosis increased  ROM:   (Degrees) Left AROM Right AROM   Cervical Flexion Min limited stiffness   Cervical Extension Mod limited stiffness   Cervical Side bend Mod limiteds tiffness Mod limited stiffness   Cervical Rotation Mod limited stiffness Mod limited stiffness    Cervical Protrusion    Cervical Retraction Difficulty with retraction    Thoracic Flexion    Thoracic Extension Mod limited stiffness    Thoracic Rotation      Left AROM Right AROM   Shoulder Flexion     Shoulder Extension     Shoulder Abduction     Shoulder Adduction     Shoulder IR     Shoulder ER     Shoulder Horiz Abduction     Shoulder Horiz Adduction       STRENGTH (MYOTOMES):  */5 Left Right   Cervical Flexion (C1-2) 5 5   Cervical Sidebending (C3) 5 5   Shoulder Elevation (C4) 5 5   Shoulder Abduction (C5) 5 5   Shoulder Internal Rotation     Shoulder External Rotation     Elbow Flexion (C6) 5 5   Elbow Extension (C7) 5 5   Wrist Flexion (C7) 4+ 5   Wrist Extension (C6) 4+ 5   Thumb abduction (C8) 4+ 5   Finger Abduction (T1) 4 5     DTR'S: WNL  CORD SIGNS:   DERMATOMES:  decreased sensation  digit 4-5 L   FLEXIBILITY:    SPECIAL TESTS:   Cervical Special Tests Left Right   Cervical compression neg neg   Cervical distraction neg neg   Spurling's test neg neg   Shoulder abduction sign     Deep neck flexor endurance test (seconds)    Upper cervical rotation     Upper cervical Sidebending     Sharp-Dorothy     Alar ligament test     Other:     UE Nerve Mobility Left Right   Median nerve neg    Ulnar nerve neg    Radial nerve     Thoracic outlet Left Right   Mike     Adson's     Cervical rotation lateral flexion     Other:     Other:       PALPATION: min tender to L UT and L scalenes/suboccitals   SPINAL SEGMENTAL CONCLUSIONS:     Assessment & Plan   CLINICAL IMPRESSIONS  Medical Diagnosis: Cervical radiculopathy    Treatment Diagnosis: Left Cervical Radiculopathy   Impression/Assessment: Patient is a 69 year old male with neck and radicular complaints.  The following significant findings have been identified: Pain, Decreased ROM/flexibility, Decreased joint mobility, Decreased strength, Inflammation, Impaired muscle performance, Decreased activity tolerance, and Impaired posture. These impairments interfere with their ability to perform self care tasks, work tasks, recreational activities, household chores, driving , household mobility, and community mobility as compared to previous level of function.     Clinical Decision Making (Complexity):  Clinical Presentation: Stable/Uncomplicated  Clinical Presentation Rationale: based on medical and personal factors listed in PT evaluation  Clinical Decision Making (Complexity): Low complexity    PLAN OF CARE  Treatment Interventions:  Modalities: Cryotherapy, Cupping, Dry Needling, E-stim, Hot Pack, Mechanical Traction  Interventions: Gait Training, Manual Therapy, Neuromuscular Re-education, Therapeutic Activity, Therapeutic Exercise, Self-Care/Home Management    Long Term Goals     PT Goal 1  Goal Identifier: Reading  Goal Description: The patient will be able to  read x60 minutes without increase in radicular symptoms to improve ease with daily activities.  Target Date: 06/09/25  PT Goal 2  Goal Identifier: Sleeping  Goal Description: The patient will be able to sleep through the night wihtout waking due to increased radicular symptoms to improve restful sleep 5 nights/week  Target Date: 06/09/25      Frequency of Treatment: 1x/week tapering to 1x every 2 weeks  Duration of Treatment: 90 days    Recommended Referrals to Other Professionals:   Education Assessment:   Learner/Method: Patient  Education Comments: Eager to participate in therapy. Patient demonstrates understanding of plan of care and consents to treatment.    Risks and benefits of evaluation/treatment have been explained.   Patient/Family/caregiver agrees with Plan of Care.     Evaluation Time:     PT Eval, Low Complexity Minutes (14785): 15       Signing Clinician: Fara Hdez, PT        [facility]                                                                                   OUTPATIENT PHYSICAL THERAPY      PLAN OF TREATMENT FOR OUTPATIENT REHABILITATION   Patient's Last Name, First Name, JETTDu Dey YOB: 1955   Provider's Name   [facility]   Medical Record No.  [id number]     Onset Date: 02/24/25 (MD visit)  Start of Care Date: 03/11/25     Medical Diagnosis:  Cervical radiculopathy      PT Treatment Diagnosis:  Left Cervical Radiculopathy Plan of Treatment  Frequency/Duration: 1x/week tapering to 1x every 2 weeks/ 90 days    Certification date from 03/11/25 to 06/09/25         See note for plan of treatment details and functional goals     Fara Hdez, PT                         I CERTIFY THE NEED FOR THESE SERVICES FURNISHED UNDER        THIS PLAN OF TREATMENT AND WHILE UNDER MY CARE     (Physician attestation of this document indicates review and certification of the therapy plan).               Referring Provider:  Shawna Reese    Initial Assessment  See Epic Evaluation- Start of Care Date: 03/11/25

## 2025-03-12 ENCOUNTER — TRANSFERRED RECORDS (OUTPATIENT)
Dept: HEALTH INFORMATION MANAGEMENT | Facility: CLINIC | Age: 70
End: 2025-03-12
Payer: COMMERCIAL

## 2025-03-31 DIAGNOSIS — M54.12 CERVICAL RADICULOPATHY: Primary | ICD-10-CM

## 2025-04-01 ENCOUNTER — OFFICE VISIT (OUTPATIENT)
Dept: AUDIOLOGY | Facility: CLINIC | Age: 70
End: 2025-04-01
Attending: FAMILY MEDICINE
Payer: COMMERCIAL

## 2025-04-01 DIAGNOSIS — H91.93 BILATERAL HEARING LOSS, UNSPECIFIED HEARING LOSS TYPE: ICD-10-CM

## 2025-04-01 DIAGNOSIS — H61.22 IMPACTED CERUMEN OF LEFT EAR: Primary | ICD-10-CM

## 2025-04-01 PROCEDURE — V5299 HEARING SERVICE: HCPCS | Performed by: AUDIOLOGIST

## 2025-04-01 NOTE — PROGRESS NOTES
No charge appointment. Mr. Mayo indicated decreased hearing ability, possibly worse in the left ear than in the right ear, which has occurred gradually over the past 1-2 years. He occasionally has difficulty understanding conversations in groups/noisier environments. 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 He denied tinnitus, dizziness/vertigo, otalgia, otorrhea, recent illness, or aural fullness.    Otoscopy revealed a clear right ear canal. The left canal was mostly obscured by cerumen. Additionally, the superior portion of the left pinna was bandaged, secondary to skin cancer removal (per patient report). Because of the presence of both the bandage (which may have impacted fit of testing transducers) and the cerumen, testing was deferred until the external ear has healed and cerumen has been safely removed by a medical provider. Mr. Mayo will reschedule hearing evaluation once these two conditions are satisfied. He expressed verbal understanding of this information and plan.    Melvin Garcia, [facility]  [place] Licensed Audiologist [id number]

## 2025-04-09 DIAGNOSIS — M54.12 CERVICAL RADICULOPATHY: Primary | ICD-10-CM

## 2025-04-10 ENCOUNTER — PATIENT OUTREACH (OUTPATIENT)
Dept: CARE COORDINATION | Facility: CLINIC | Age: 70
End: 2025-04-10
Payer: COMMERCIAL

## 2025-04-14 ENCOUNTER — PATIENT OUTREACH (OUTPATIENT)
Dept: CARE COORDINATION | Facility: CLINIC | Age: 70
End: 2025-04-14

## 2025-04-21 ENCOUNTER — THERAPY VISIT (OUTPATIENT)
Dept: PHYSICAL THERAPY | Facility: REHABILITATION | Age: 70
End: 2025-04-21
Payer: MEDICARE

## 2025-04-21 DIAGNOSIS — M54.12 CERVICAL RADICULOPATHY: Primary | ICD-10-CM

## 2025-04-21 PROCEDURE — 97012 MECHANICAL TRACTION THERAPY: CPT | Mod: GP | Performed by: PHYSICAL THERAPIST

## 2025-04-21 PROCEDURE — 97112 NEUROMUSCULAR REEDUCATION: CPT | Mod: GP | Performed by: PHYSICAL THERAPIST

## 2025-04-21 PROCEDURE — 97140 MANUAL THERAPY 1/> REGIONS: CPT | Mod: GP | Performed by: PHYSICAL THERAPIST

## 2025-04-28 ENCOUNTER — THERAPY VISIT (OUTPATIENT)
Dept: PHYSICAL THERAPY | Facility: REHABILITATION | Age: 70
End: 2025-04-28
Payer: MEDICARE

## 2025-04-28 DIAGNOSIS — M54.12 CERVICAL RADICULOPATHY: Primary | ICD-10-CM

## 2025-04-28 PROCEDURE — 97140 MANUAL THERAPY 1/> REGIONS: CPT | Mod: GP | Performed by: PHYSICAL THERAPIST

## 2025-04-28 PROCEDURE — 97110 THERAPEUTIC EXERCISES: CPT | Mod: GP | Performed by: PHYSICAL THERAPIST

## 2025-04-29 PROBLEM — M54.12 CERVICAL RADICULOPATHY: Status: RESOLVED | Noted: 2025-03-11 | Resolved: 2025-04-29

## 2025-04-29 NOTE — PROGRESS NOTES
04/28/25 0500   Appointment Info   Signing clinician's name / credentials Fara Hdez, PT DPT   Total/Authorized Visits E[employer]   Visits Used 5   Medical Diagnosis Cervical radiculopathy   PT Tx Diagnosis Left Cervical Radiculopathy   Progress Note/Certification   Start of Care Date 03/11/25   Onset of illness/injury or Date of Surgery 02/24/25  (MD visit)   Therapy Frequency 1x/week tapering to 1x every 2 weeks   Predicted Duration 90 days   Certification date from 03/11/25   Certification date to 06/09/25   Progress Note Completed Date 03/11/25   GOALS   PT Goals 2   PT Goal 1   Goal Identifier Reading   Goal Description The patient will be able to read x60 minutes without increase in radicular symptoms to improve ease with daily activities.   Goal Progress goal met   Target Date 06/09/25   Date Met 04/28/25   PT Goal 2   Goal Identifier Sleeping   Goal Description The patient will be able to sleep through the night wihtout waking due to increased radicular symptoms to improve restful sleep 5 nights/week   Goal Progress goal met   Target Date 06/09/25   Date Met 04/28/25   Subjective Report   Subjective Report The patient reports that he recieved his home traction unit and has been using it consistently. He feels that he notices some continued mild improvement in hand N/T. He doesn't notice the symptoms at rest, but only notices them when he rubs his own hand and notices that it feels different. At this point he experiences no pain, feels improving dexterity of involved hand. Feels at this point that he can continue with his exercises independently and thinks that he will not get the MRI at this point due to improvement in symptoms   Objective Measures   Objective Measures Objective Measure 1;Objective Measure 2   Objective Measure 1   Objective Measure Pain Rating   Objective Measure 2   Objective Measure  strength   Details 110# on R 55# on L L hand improved to 60# following mechanical traction   PT  Modalities   PT Modalities Mechanical Traction   Mechanical Traction   Traction -Type Cervical;Home   Position supine   Type Static   Duration 10 min   Max poundage 20#   Min poundage 0#   Patient Response/Progress reviewed independent performance of traction for when he recieves his home unit.   Treatment Interventions (PT)   Interventions Therapeutic Procedure/Exercise;Manual Therapy;Neuromuscular Re-education   Therapeutic Procedure/Exercise   Therapeutic Procedures: strength, endurance, ROM, flexibility minutes (03766) 10   Therapeutic Procedures Ther Proc 2   Ther Proc 1 review of progress and discharge planning   Ther Proc 2 cervical retraction   Ther Proc 2 - Details x10   PTRx Ther Proc 1 Scapular Retraction/Depression   PTRx Ther Proc 1 - Details x10   Skilled Intervention review of HEP   Patient Response/Progress tolerated well, no increase in symptoms   Neuromuscular Re-education   Neuro Re-ed 1 pull aparts gtb x15   Neuro Re-ed 1 - Details rowing and pulldowns   PTRx Neuro Re-ed 1 green theraband x20 each   Skilled Intervention cueing to improve posture and scap retraction   Patient Response/Progress symptom free, good tolerance   Manual Therapy   Manual Therapy: Mobilization, MFR, MLD, friction massage minutes (11859) 15   Manual Therapy 1 MFR layer 2 to L>R UT, suboccipitals and posterior shoulder   Manual Therapy 1 - Details in supine legs elevated   Skilled Intervention to improve radicular symptoms   Patient Response/Progress minimal change in symptoms reported during or after MT   Education   Learner/Method Patient   Education Comments Eager to participate in therapy. Patient demonstrates understanding of plan of care and consents to treatment.   Plan   Home program see PTRX printed   Comments   Comments The patient has attended PT consistently and is demonstrating improvements in pain, mobility and function. At this point goals have been met and they feel comfortable continuing with independent  management of their condition. Advised the patient to return to therapy as needed in the future and to continue with prescribed HEP. The patient will be discharged at this time.   Total Session Time   Timed Code Treatment Minutes 25   Total Treatment Time (sum of timed and untimed services) 25       DISCHARGE  Reason for Discharge: Patient has met all goals.    Equipment Issued: Home traction unit     Discharge Plan: Patient to continue home program.    Referring Provider:  Shawna Reese

## 2025-05-01 ENCOUNTER — TRANSFERRED RECORDS (OUTPATIENT)
Dept: HEALTH INFORMATION MANAGEMENT | Facility: CLINIC | Age: 70
End: 2025-05-01
Payer: MEDICARE

## 2025-05-06 ENCOUNTER — PATIENT OUTREACH (OUTPATIENT)
Dept: GASTROENTEROLOGY | Facility: CLINIC | Age: 70
End: 2025-05-06
Payer: MEDICARE

## 2025-05-06 DIAGNOSIS — Z12.11 SPECIAL SCREENING FOR MALIGNANT NEOPLASM OF COLON: Primary | ICD-10-CM

## 2025-05-06 NOTE — PROGRESS NOTES
"CRC Screening Colonoscopy Referral Review    Patient meets the inclusion criteria for screening colonoscopy standing order.    Ordering/Referring Provider:  Alphonse Moreno      BMI: Estimated body mass index is 32.89 kg/m  as calculated from the following:    Height as of 11/12/24: 1.803 m (5' 11\").    Weight as of 11/12/24: 107 kg (235 lb 12.8 oz).     Sedation:  Does patient have any of the following conditions affecting sedation?  No medical conditions affecting sedation.    Previous Scopes:  Any previous recommendations or follow up needs based on previous scope?  Location recommendations: severe JORGE, needs hospital    Medical Concerns to Postpone Order:  Does patient have any of the following medical concerns that should postpone/delay colonoscopy referral?  No medical conditions affecting colonoscopy referral.    Final Referral Details:  Based on patient's medical history patient is appropriate for referral order with moderate sedation. If patient's BMI > 50 do not schedule in [facility].  "

## 2025-06-30 DIAGNOSIS — E06.3 HYPOTHYROIDISM DUE TO HASHIMOTO THYROIDITIS: ICD-10-CM

## 2025-06-30 NOTE — TELEPHONE ENCOUNTER
General Call    Reason for Call:  Pt requesting that his prescription  levothyroxine (SYNTHROID/LEVOTHROID) 150 MCG tablet be sent to different pharmacy d/t insurance.     Date of last appointment with provider: 11/12/25    MICHELLE Echeverria  [facility]

## 2025-07-01 RX ORDER — LEVOTHYROXINE SODIUM 150 UG/1
150 TABLET ORAL DAILY
Qty: 90 TABLET | Refills: 0 | Status: SHIPPED | OUTPATIENT
Start: 2025-07-01

## 2025-08-18 ENCOUNTER — TRANSFERRED RECORDS (OUTPATIENT)
Dept: ADMINISTRATIVE | Facility: CLINIC | Age: 70
End: 2025-08-18
Payer: MEDICARE

## 2025-08-19 ENCOUNTER — RESULTS FOLLOW-UP (OUTPATIENT)
Dept: GASTROENTEROLOGY | Facility: CLINIC | Age: 70
End: 2025-08-19
Payer: MEDICARE

## 2025-08-19 PROBLEM — D12.6 COLON ADENOMAS: Status: ACTIVE | Noted: 2025-08-19
